# Patient Record
Sex: FEMALE | Race: AMERICAN INDIAN OR ALASKA NATIVE
[De-identification: names, ages, dates, MRNs, and addresses within clinical notes are randomized per-mention and may not be internally consistent; named-entity substitution may affect disease eponyms.]

---

## 2017-10-22 ENCOUNTER — HOSPITAL ENCOUNTER (EMERGENCY)
Dept: HOSPITAL 11 - JP.ED | Age: 27
LOS: 1 days | Discharge: NURSING, REHAB OR LONG-TERM CARE | End: 2017-10-23
Payer: MEDICAID

## 2017-10-22 DIAGNOSIS — Z79.4: ICD-10-CM

## 2017-10-22 DIAGNOSIS — E11.10: Primary | ICD-10-CM

## 2017-10-22 DIAGNOSIS — F17.210: ICD-10-CM

## 2017-10-22 PROCEDURE — 96374 THER/PROPH/DIAG INJ IV PUSH: CPT

## 2017-10-22 PROCEDURE — 85025 COMPLETE CBC W/AUTO DIFF WBC: CPT

## 2017-10-22 PROCEDURE — 82803 BLOOD GASES ANY COMBINATION: CPT

## 2017-10-22 PROCEDURE — 82009 KETONE BODYS QUAL: CPT

## 2017-10-22 PROCEDURE — 96361 HYDRATE IV INFUSION ADD-ON: CPT

## 2017-10-22 PROCEDURE — 81001 URINALYSIS AUTO W/SCOPE: CPT

## 2017-10-22 PROCEDURE — 36415 COLL VENOUS BLD VENIPUNCTURE: CPT

## 2017-10-22 PROCEDURE — 80305 DRUG TEST PRSMV DIR OPT OBS: CPT

## 2017-10-22 PROCEDURE — 93005 ELECTROCARDIOGRAM TRACING: CPT

## 2017-10-22 PROCEDURE — 80053 COMPREHEN METABOLIC PANEL: CPT

## 2017-10-22 PROCEDURE — 99285 EMERGENCY DEPT VISIT HI MDM: CPT

## 2017-10-22 PROCEDURE — 83605 ASSAY OF LACTIC ACID: CPT

## 2017-10-22 PROCEDURE — 86140 C-REACTIVE PROTEIN: CPT

## 2017-10-22 PROCEDURE — 83690 ASSAY OF LIPASE: CPT

## 2017-10-22 PROCEDURE — 36600 WITHDRAWAL OF ARTERIAL BLOOD: CPT

## 2017-10-22 PROCEDURE — 71020: CPT

## 2017-10-23 VITALS — SYSTOLIC BLOOD PRESSURE: 143 MMHG | DIASTOLIC BLOOD PRESSURE: 67 MMHG

## 2017-10-23 NOTE — EDM.PDOC
ED HPI GENERAL MEDICAL PROBLEM





- General


Chief Complaint: Gastrointestinal Problem


Stated Complaint: NAUSEA


Time Seen by Provider: 10/22/17 23:50


Source of Information: Reports: Patient, Family, RN Notes Reviewed


History Limitations: Reports: No Limitations





- History of Present Illness


INITIAL COMMENTS - FREE TEXT/NARRATIVE: 





27-year-old female presents emergency department today general complaint of not 

feeling well she states been ill for the last 24 hours feels short of breath 

feels nauseated no chest pain denies any fevers no problems with bowel 

movements or urination


  ** bottom


Pain Score (Numeric/FACES): 5





- Related Data


 Allergies











Allergy/AdvReac Type Severity Reaction Status Date / Time


 


ibuprofen Allergy Unknown Swelling Verified 10/22/17 23:40











Home Meds: 


 Home Meds





Insulin Aspart [Novolog Flexpen] 1 unit SQ ASDIRECTED 01/03/15 [History]


Insulin Detemir [Levemir] 20 unit SQ BID 01/03/15 [History]











Past Medical History


Gastrointestinal History: Reports: Other (See Below)


Other Gastrointestinal History: 90# weight loss in past year


Endocrine/Metabolic History: Reports: Diabetes, Type II


Other Dermatologic History: boils on buttock and abdomin





- Past Surgical History


GI Surgical History: Reports: Cholecystectomy





Social & Family History





- Tobacco Use


Smoking Status *Q: Current Every Day Smoker


Years of Tobacco use: 10


Packs/Tins Daily: 1


Second Hand Smoke Exposure: No





- Caffeine Use


Caffeine Use: Reports: None





- Alcohol Use


Days Per Week of Alcohol Use: 0





- Recreational Drug Use


Recreational Drug Use: Yes


Drug Use in Last 12 Months: Yes


Recreational Drug Type: Reports: Methamphetamine


Recreational Drug Use Frequency: Daily


Recreational Drug Last Use: within 1 week





- Living Situation & Occupation


Living situation: Reports: Single





ED ROS GENERAL





- Review of Systems


Review Of Systems: See Below


Constitutional: Denies: Fever, Chills


HEENT: Reports: No Symptoms


Respiratory: Reports: Shortness of Breath.  Denies: Cough, Sputum


Cardiovascular: Reports: No Symptoms


GI/Abdominal: Reports: Nausea


: Reports: No Symptoms


Musculoskeletal: Reports: No Symptoms


Skin: Reports: Other


Neurological: Reports: No Symptoms (Boils)





ED EXAM, GENERAL





- Physical Exam


Exam: See Below


Free Text/Narrative:: 





General: Female, not in any distress, alert and oriented x3 HEENT: head is 

atraumatic normocephalic, eyes pupils equal round reactive to light, sclera 

clear no conjunctivitis appreciated.  Ears tympanic membranes clear and gray 

landmarks and light reflex are present bilaterally canals are clear.  Nose no 

septal deviation, nares are clear, no blood present.  Mouth mucosa is moist and 

pink no erythema or exudate noted in soft palate, tongue is midline uvula is 

midline, dentition is poor obese,.


Neck: Supple no thyromegaly no tracheal deviation.


Nodes: Cervical nodes subclavicular nodes nontender no palpable lymphadenopathy 

noted.


Lungs: clear to auscultation bilaterally with symmetrical respirations, no 

adventitious noise appreciated.


CV: Regular rate and rhythm S1 and S2 appreciated no murmurs rubs or gallops 

noted.


Abdomen: Soft, nontender, no palpable masses or organomegaly appreciated, no 

distention no guarding bowel sounds are present,  .


Neuro: Cranial nerves II through XII grossly intact


Skin: Multiple superficial boils appreciated on the abdomen


Extremities: No lower extremity edema appreciated, 





Course





- Vital Signs


Last Recorded V/S: 


 Last Vital Signs











Temp  98.2 F   10/23/17 01:36


 


Pulse  57 L  10/23/17 01:36


 


Resp  16   10/23/17 01:36


 


BP  143/67 H  10/23/17 01:36


 


Pulse Ox  100   10/23/17 01:36














- Orders/Labs/Meds


Orders: 


 Active Orders 24 hr











 Category Date Time Status


 


 EKG Documentation Completion [RC] ASDIRECTED Care  10/23/17 01:45 Active


 


 Peripheral IV Care [RC] .AS DIRECTED Care  10/22/17 23:57 Active


 


 Chest 2V [CR] Urgent Exams  10/23/17 00:01 Taken


 


 Insulin Regular, Human [NovoLIN R] 100 unit Med  10/23/17 02:15 Active





 Sodium Chloride 0.9% [Normal Saline] 100 ml   





 IV TITRATE   


 


 Lactated Ringers [Ringers, Lactated] 1,000 ml Med  10/22/17 23:45 Active





 IV ASDIRECTED   


 


 Lactated Ringers [Ringers, Lactated] 1,000 ml Med  10/23/17 01:39 Active





 IV BOLUS   


 


 Potassium Chloride [KCL 20 MEQ in Water 100 ML] 20 meq Med  10/23/17 01:09 

Active





 Premix Bag 1 bag   





 IV ONETIME   


 


 Sodium Chloride 0.9% [Saline Flush] Med  10/22/17 23:57 Active





 10 ml FLUSH ASDIRECTED PRN   


 


 Peripheral IV Insertion Adult [OM.PC] Urgent Oth  10/22/17 23:57 Ordered


 


 EKG 12 Lead [EK] Stat Ther  10/23/17 01:45 Ordered








 Medication Orders





Lactated Ringer's (Ringers, Lactated)  1,000 mls @ 999 mls/hr IV ASDIRECTED JUAN


   Last Admin: 10/23/17 00:12  Dose: 999 mls/hr


Potassium Chloride 20 meq/ (Premix)  100 mls @ 50 mls/hr IV ONETIME ONE


   Stop: 10/23/17 03:08


   Last Admin: 10/23/17 01:20  Dose: 50 mls/hr


Lactated Ringer's (Ringers, Lactated)  1,000 mls @ 999 mls/hr IV BOLUS ONE


   Stop: 10/23/17 02:39


   Last Admin: 10/23/17 01:45  Dose: 999 mls/hr


Insulin Human Regular 100 unit (/ Sodium Chloride)  100 mls @ 0 mls/hr IV 

TITRATE JUAN; 0.1 UNITS/KG/HR


   PRN Reason: Protocol


Sodium Chloride (Saline Flush)  10 ml FLUSH ASDIRECTED PRN


   PRN Reason: Keep Vein Open


   Last Admin: 10/23/17 00:14  Dose: 10 ml








Labs: 


 Laboratory Tests











  10/22/17 10/22/17 10/22/17 Range/Units





  00:15 00:15 00:15 


 


WBC  16.2 H    (4.5-11.0)  K/uL


 


RBC  5.34    (3.30-5.50)  M/uL


 


Hgb  15.0    (12.0-15.0)  g/dL


 


Hct  45.3    (36.0-48.0)  %


 


MCV  85    (80-98)  fL


 


MCH  28    (27-31)  pg


 


MCHC  33    (32-36)  %


 


Plt Count  269    (150-400)  K/uL


 


Neut % (Auto)  83 H    (36-66)  %


 


Lymph % (Auto)  8 L    (24-44)  %


 


Mono % (Auto)  9 H    (2-6)  %


 


Eos % (Auto)  0 L    (2-4)  %


 


Baso % (Auto)  0    (0-1)  %


 


Puncture Site     


 


ABG pH     (7.350-7.450)  


 


ABG pCO2     (35.0-42.0)  mmHg


 


ABG pO2     (75.0-100.0)  mmHg


 


ABG HCO3     (22.0-26.0)  mmol/L


 


ABG Total CO2     (21.0-25.0)  mmol/L


 


ABG O2 Saturation     (95.0-98.0)  %


 


ABG O2 Content     (15.0-23.0)  %vol


 


ABG Base Excess     mm/L


 


ABG Hemoglobin     (12.0-16.0)  g/dL


 


ABG Oxyhemoglobin     %


 


ABG Carboxyhemoglobin     (0.0-1.6)  %


 


ABG Methemoglobin     %


 


Abner Test     


 


O2 Delivery Device     


 


Sodium   135 L   (140-148)  mmol/L


 


Potassium   2.9 L*   (3.6-5.2)  mmol/L


 


Chloride   105   (100-108)  mmol/L


 


Carbon Dioxide   8 L   (21-32)  mmol/L


 


Anion Gap   24.9 H   (5.0-14.0)  mmol/L


 


BUN   6 L D   (7-18)  mg/dL


 


Creatinine   0.8   (0.6-1.0)  mg/dL


 


Est Cr Clr Drug Dosing   109.41   mL/min


 


Estimated GFR (MDRD)   > 60   (>60)  


 


Glucose   247 H   ()  mg/dL


 


Lactic Acid    1.8  (0.4-2.0)  mmol/L


 


Calcium   8.2 L   (8.5-10.1)  mg/dL


 


Total Bilirubin   0.4   (0.2-1.0)  mg/dL


 


AST   8 L   (15-37)  U/L


 


ALT   9 L   (12-78)  U/L


 


Alkaline Phosphatase   139 H   ()  U/L


 


C-Reactive Protein   3.41 H   (0.0-0.3)  mg/dL


 


Total Protein   7.4   (6.4-8.2)  g/dL


 


Albumin   3.0 L   (3.4-5.0)  g/dL


 


Globulin   4.4 H   (2.3-3.5)  g/dL


 


Albumin/Globulin Ratio   0.7 L   (1.2-2.2)  


 


Lipase   70 L   ()  U/L


 


Urine Color     


 


Urine Appearance     


 


Urine pH     (4.5-8.0)  


 


Ur Specific Gravity     (1.008-1.030)  


 


Urine Protein     (NEGATIVE)  mg/dL


 


Urine Glucose (UA)     (NEGATIVE)  mg/dL


 


Urine Ketones     (NEGATIVE)  mg/dL


 


Urine Occult Blood     (NEGATIVE)  


 


Urine Nitrite     (NEGATIVE)  


 


Urine Bilirubin     (NEGATIVE)  


 


Urine Urobilinogen     (NORMAL)  mg/dL


 


Ur Leukocyte Esterase     (NEGATIVE)  


 


Urine RBC     (0-5)  


 


Urine WBC     (0-5)  


 


Ur Epithelial Cells     


 


Amorphous Sediment     


 


Urine Bacteria     


 


Urine Mucus     


 


Urine Opiates Screen     (NEGATIVE)  


 


Ur Oxycodone Screen     (NEGATIVE)  


 


Urine Methadone Screen     (NEGATIVE)  


 


Ur Propoxyphene Screen     (NEGATIVE)  


 


Ur Barbiturates Screen     (NEGATIVE)  


 


Ur Tricyclics Screen     (NEGATIVE)  


 


Ur Phencyclidine Scrn     (NEGATIVE)  


 


Ur Amphetamine Screen     (NEGATIVE)  


 


U Methamphetamines Scrn     (NEGATIVE)  


 


Urine MDMA Screen     (NEGATIVE)  


 


U Benzodiazepines Scrn     (NEGATIVE)  


 


U Cocaine Metab Screen     (NEGATIVE)  


 


U Marijuana (THC) Screen     (NEGATIVE)  


 


Ketones     (NEGATIVE)  














  10/23/17 10/23/17 10/23/17 Range/Units





  00:50 00:50 01:09 


 


WBC     (4.5-11.0)  K/uL


 


RBC     (3.30-5.50)  M/uL


 


Hgb     (12.0-15.0)  g/dL


 


Hct     (36.0-48.0)  %


 


MCV     (80-98)  fL


 


MCH     (27-31)  pg


 


MCHC     (32-36)  %


 


Plt Count     (150-400)  K/uL


 


Neut % (Auto)     (36-66)  %


 


Lymph % (Auto)     (24-44)  %


 


Mono % (Auto)     (2-6)  %


 


Eos % (Auto)     (2-4)  %


 


Baso % (Auto)     (0-1)  %


 


Puncture Site    L radial  


 


ABG pH    7.233 L  (7.350-7.450)  


 


ABG pCO2    12.8 L*  (35.0-42.0)  mmHg


 


ABG pO2    113.0 H  (75.0-100.0)  mmHg


 


ABG HCO3    5.2 L  (22.0-26.0)  mmol/L


 


ABG Total CO2    4.8 L  (21.0-25.0)  mmol/L


 


ABG O2 Saturation    98.2 H  (95.0-98.0)  %


 


ABG O2 Content    19.0  (15.0-23.0)  %vol


 


ABG Base Excess    -21.3  mm/L


 


ABG Hemoglobin    14.0  (12.0-16.0)  g/dL


 


ABG Oxyhemoglobin    96.2  %


 


ABG Carboxyhemoglobin    1.1  (0.0-1.6)  %


 


ABG Methemoglobin    0.9  %


 


Abner Test    Ok  


 


O2 Delivery Device    Room air  


 


Sodium     (140-148)  mmol/L


 


Potassium     (3.6-5.2)  mmol/L


 


Chloride     (100-108)  mmol/L


 


Carbon Dioxide     (21-32)  mmol/L


 


Anion Gap     (5.0-14.0)  mmol/L


 


BUN     (7-18)  mg/dL


 


Creatinine     (0.6-1.0)  mg/dL


 


Est Cr Clr Drug Dosing     mL/min


 


Estimated GFR (MDRD)     (>60)  


 


Glucose     ()  mg/dL


 


Lactic Acid     (0.4-2.0)  mmol/L


 


Calcium     (8.5-10.1)  mg/dL


 


Total Bilirubin     (0.2-1.0)  mg/dL


 


AST     (15-37)  U/L


 


ALT     (12-78)  U/L


 


Alkaline Phosphatase     ()  U/L


 


C-Reactive Protein     (0.0-0.3)  mg/dL


 


Total Protein     (6.4-8.2)  g/dL


 


Albumin     (3.4-5.0)  g/dL


 


Globulin     (2.3-3.5)  g/dL


 


Albumin/Globulin Ratio     (1.2-2.2)  


 


Lipase     ()  U/L


 


Urine Color   Yellow   


 


Urine Appearance   Slightly cloudy   


 


Urine pH   5.0   (4.5-8.0)  


 


Ur Specific Gravity   1.020   (1.008-1.030)  


 


Urine Protein   30 H   (NEGATIVE)  mg/dL


 


Urine Glucose (UA)   >1000 H   (NEGATIVE)  mg/dL


 


Urine Ketones   150 H   (NEGATIVE)  mg/dL


 


Urine Occult Blood   Negative   (NEGATIVE)  


 


Urine Nitrite   Negative   (NEGATIVE)  


 


Urine Bilirubin   Negative   (NEGATIVE)  


 


Urine Urobilinogen   Normal   (NORMAL)  mg/dL


 


Ur Leukocyte Esterase   Negative   (NEGATIVE)  


 


Urine RBC   0-5   (0-5)  


 


Urine WBC   5-10 H   (0-5)  


 


Ur Epithelial Cells   Moderate   


 


Amorphous Sediment   Not seen   


 


Urine Bacteria   Few   


 


Urine Mucus   Not seen   


 


Urine Opiates Screen  Negative    (NEGATIVE)  


 


Ur Oxycodone Screen  Negative    (NEGATIVE)  


 


Urine Methadone Screen  Negative    (NEGATIVE)  


 


Ur Propoxyphene Screen  Negative    (NEGATIVE)  


 


Ur Barbiturates Screen  Negative    (NEGATIVE)  


 


Ur Tricyclics Screen  Negative    (NEGATIVE)  


 


Ur Phencyclidine Scrn  Negative    (NEGATIVE)  


 


Ur Amphetamine Screen  Negative    (NEGATIVE)  


 


U Methamphetamines Scrn  Negative    (NEGATIVE)  


 


Urine MDMA Screen  Negative    (NEGATIVE)  


 


U Benzodiazepines Scrn  Negative    (NEGATIVE)  


 


U Cocaine Metab Screen  Negative    (NEGATIVE)  


 


U Marijuana (THC) Screen  Positive H    (NEGATIVE)  


 


Ketones     (NEGATIVE)  














  10/23/17 Range/Units





  01:11 


 


WBC   (4.5-11.0)  K/uL


 


RBC   (3.30-5.50)  M/uL


 


Hgb   (12.0-15.0)  g/dL


 


Hct   (36.0-48.0)  %


 


MCV   (80-98)  fL


 


MCH   (27-31)  pg


 


MCHC   (32-36)  %


 


Plt Count   (150-400)  K/uL


 


Neut % (Auto)   (36-66)  %


 


Lymph % (Auto)   (24-44)  %


 


Mono % (Auto)   (2-6)  %


 


Eos % (Auto)   (2-4)  %


 


Baso % (Auto)   (0-1)  %


 


Puncture Site   


 


ABG pH   (7.350-7.450)  


 


ABG pCO2   (35.0-42.0)  mmHg


 


ABG pO2   (75.0-100.0)  mmHg


 


ABG HCO3   (22.0-26.0)  mmol/L


 


ABG Total CO2   (21.0-25.0)  mmol/L


 


ABG O2 Saturation   (95.0-98.0)  %


 


ABG O2 Content   (15.0-23.0)  %vol


 


ABG Base Excess   mm/L


 


ABG Hemoglobin   (12.0-16.0)  g/dL


 


ABG Oxyhemoglobin   %


 


ABG Carboxyhemoglobin   (0.0-1.6)  %


 


ABG Methemoglobin   %


 


Abner Test   


 


O2 Delivery Device   


 


Sodium   (140-148)  mmol/L


 


Potassium   (3.6-5.2)  mmol/L


 


Chloride   (100-108)  mmol/L


 


Carbon Dioxide   (21-32)  mmol/L


 


Anion Gap   (5.0-14.0)  mmol/L


 


BUN   (7-18)  mg/dL


 


Creatinine   (0.6-1.0)  mg/dL


 


Est Cr Clr Drug Dosing   mL/min


 


Estimated GFR (MDRD)   (>60)  


 


Glucose   ()  mg/dL


 


Lactic Acid   (0.4-2.0)  mmol/L


 


Calcium   (8.5-10.1)  mg/dL


 


Total Bilirubin   (0.2-1.0)  mg/dL


 


AST   (15-37)  U/L


 


ALT   (12-78)  U/L


 


Alkaline Phosphatase   ()  U/L


 


C-Reactive Protein   (0.0-0.3)  mg/dL


 


Total Protein   (6.4-8.2)  g/dL


 


Albumin   (3.4-5.0)  g/dL


 


Globulin   (2.3-3.5)  g/dL


 


Albumin/Globulin Ratio   (1.2-2.2)  


 


Lipase   ()  U/L


 


Urine Color   


 


Urine Appearance   


 


Urine pH   (4.5-8.0)  


 


Ur Specific Gravity   (1.008-1.030)  


 


Urine Protein   (NEGATIVE)  mg/dL


 


Urine Glucose (UA)   (NEGATIVE)  mg/dL


 


Urine Ketones   (NEGATIVE)  mg/dL


 


Urine Occult Blood   (NEGATIVE)  


 


Urine Nitrite   (NEGATIVE)  


 


Urine Bilirubin   (NEGATIVE)  


 


Urine Urobilinogen   (NORMAL)  mg/dL


 


Ur Leukocyte Esterase   (NEGATIVE)  


 


Urine RBC   (0-5)  


 


Urine WBC   (0-5)  


 


Ur Epithelial Cells   


 


Amorphous Sediment   


 


Urine Bacteria   


 


Urine Mucus   


 


Urine Opiates Screen   (NEGATIVE)  


 


Ur Oxycodone Screen   (NEGATIVE)  


 


Urine Methadone Screen   (NEGATIVE)  


 


Ur Propoxyphene Screen   (NEGATIVE)  


 


Ur Barbiturates Screen   (NEGATIVE)  


 


Ur Tricyclics Screen   (NEGATIVE)  


 


Ur Phencyclidine Scrn   (NEGATIVE)  


 


Ur Amphetamine Screen   (NEGATIVE)  


 


U Methamphetamines Scrn   (NEGATIVE)  


 


Urine MDMA Screen   (NEGATIVE)  


 


U Benzodiazepines Scrn   (NEGATIVE)  


 


U Cocaine Metab Screen   (NEGATIVE)  


 


U Marijuana (THC) Screen   (NEGATIVE)  


 


Ketones  Small H  (NEGATIVE)  











Meds: 


Medications











Generic Name Dose Route Start Last Admin





  Trade Name Freq  PRN Reason Stop Dose Admin


 


Lactated Ringer's  1,000 mls @ 999 mls/hr  10/22/17 23:45  10/23/17 00:12





  Ringers, Lactated  IV   999 mls/hr





  ASDIRECTED JUAN   Administration


 


Potassium Chloride 20 meq/  100 mls @ 50 mls/hr  10/23/17 01:09  10/23/17 01:20





  Premix  IV  10/23/17 03:08  50 mls/hr





  ONETIME ONE   Administration


 


Lactated Ringer's  1,000 mls @ 999 mls/hr  10/23/17 01:39  10/23/17 01:45





  Ringers, Lactated  IV  10/23/17 02:39  999 mls/hr





  BOLUS ONE   Administration


 


Insulin Human Regular 100 unit  100 mls @ 0 mls/hr  10/23/17 02:15  





  / Sodium Chloride  IV   





  TITRATE JUAN   





  Protocol   





  0.1 UNITS/KG/HR   


 


Sodium Chloride  10 ml  10/22/17 23:57  10/23/17 00:14





  Saline Flush  FLUSH   10 ml





  ASDIRECTED PRN   Administration





  Keep Vein Open   














Discontinued Medications














Generic Name Dose Route Start Last Admin





  Trade Name Byron  PRN Reason Stop Dose Admin


 


Lidocaine HCl  Confirm  10/23/17 01:16  10/23/17 01:20





  Xylocaine-Mpf 1%  Administered  10/23/17 01:17  5 ml





  Dose   Administration





  5 ml   





  .ROUTE   





  .STK-MED ONE   


 


Ondansetron HCl  4 mg  10/23/17 01:22  10/23/17 01:34





  Zofran  IVPUSH  10/23/17 01:23  4 mg





  ONETIME ONE   Administration














Departure





- Departure


Time of Disposition: 02:16


Disposition: DC/[facility]fer to [facility]


Condition: Fair


Clinical Impression: 


Diabetic ketoacidosis associated with type 2 diabetes mellitus


Qualifiers:


 Diabetes mellitus complication detail: without coma Qualified Code(s): E13.10 

- Other specified diabetes mellitus with ketoacidosis without coma








- Discharge Information


Referrals: 


PCP,None [Primary Care Provider] - 


Forms:  ED Department Discharge





- My Orders


Last 24 Hours: 


My Active Orders





10/22/17 23:45


Lactated Ringers [Ringers, Lactated] 1,000 ml IV ASDIRECTED 





10/22/17 23:57


Peripheral IV Care [RC] .AS DIRECTED 


Sodium Chloride 0.9% [Saline Flush]   10 ml FLUSH ASDIRECTED PRN 


Peripheral IV Insertion Adult [OM.PC] Urgent 





10/23/17 00:01


Chest 2V [CR] Urgent 





10/23/17 01:09


Potassium Chloride [KCL 20 MEQ in Water 100 ML] 20 meq   Premix Bag 1 bag IV 

ONETIME 





10/23/17 01:39


Lactated Ringers [Ringers, Lactated] 1,000 ml IV BOLUS 





10/23/17 01:45


EKG Documentation Completion [RC] ASDIRECTED 


EKG 12 Lead [EK] Stat 





10/23/17 02:15


Insulin Regular, Human [NovoLIN R] 100 unit   Sodium Chloride 0.9% [Normal 

Saline] 100 ml IV TITRATE 














- Assessment/Plan


Last 24 Hours: 


My Active Orders





10/22/17 23:45


Lactated Ringers [Ringers, Lactated] 1,000 ml IV ASDIRECTED 





10/22/17 23:57


Peripheral IV Care [RC] .AS DIRECTED 


Sodium Chloride 0.9% [Saline Flush]   10 ml FLUSH ASDIRECTED PRN 


Peripheral IV Insertion Adult [OM.PC] Urgent 





10/23/17 00:01


Chest 2V [CR] Urgent 





10/23/17 01:09


Potassium Chloride [KCL 20 MEQ in Water 100 ML] 20 meq   Premix Bag 1 bag IV 

ONETIME 





10/23/17 01:39


Lactated Ringers [Ringers, Lactated] 1,000 ml IV BOLUS 





10/23/17 01:45


EKG Documentation Completion [RC] ASDIRECTED 


EKG 12 Lead [EK] Stat 





10/23/17 02:15


Insulin Regular, Human [NovoLIN R] 100 unit   Sodium Chloride 0.9% [Normal 

Saline] 100 ml IV TITRATE 











Plan: 





Assessment





Acuity = acute





Site and laterality = diabetic ketoacidosis mild complicated patient with 

history of diabetes mellitus and a history of chronic boils  





Etiology  = unclear etiology





Manifestations = nausea vomiting





Location of injury =  Home





Lab values = WBC elevated at 16.2 consistent leukocytosis ABG pH 7.23 PCO2 12.8 

bicarbonate 5.2 consistent with anion gap metabolic acidosis sodium low at 135 

consistent with hyponatremia potassium low at 2.9 consistent with hypokalemia 

glucose elevated 247 consistent hyperglycemia CRP elevated 3.41 albumin low at 

3.0 consistent hypoalbuminemia serum positive for ketones, urinalysis positive 

proteins of 30 consistent proteinuria glucose greater than 1000 consistent 

glucose urea ketones greater than 150 consistent ketonuria WBC 5-10 consistent 

with pyuria and urine drug screen positive for cannabis EKG demonstrates sinus 

rhythm chest x-ray I did review films myself I cannot appreciate any acute 

process, the official read from radiology is pending





Plan


Called discussed case with Dr. Salter hospitalist on call at [facility] 

kindly accepted the patient in transport she was started on 20 mEq of potassium 

recommended starting insulin drip prior to transport

















Patient was in agreement with the plan all questions were answered, they were 

instructed to return to the emergency department or call for worsening 

symptoms.  This note was dictated using dragon voice recognition software 

please call with any questions.

## 2017-10-23 NOTE — CR
Chest 2V

 

FINDINGS: The heart and vascular structures are normal in appearance. No infiltrates or effusions are
 demonstrated. The skeletal structures are unremarkable.

 

IMPRESSION: Negative exam.

## 2018-02-19 ENCOUNTER — HOSPITAL ENCOUNTER (EMERGENCY)
Dept: HOSPITAL 11 - JP.ED | Age: 28
LOS: 1 days | Discharge: NURSING, REHAB OR LONG-TERM CARE | End: 2018-02-20
Payer: MEDICAID

## 2018-02-19 DIAGNOSIS — F17.210: ICD-10-CM

## 2018-02-19 DIAGNOSIS — E11.10: Primary | ICD-10-CM

## 2018-02-19 DIAGNOSIS — Z88.8: ICD-10-CM

## 2018-02-19 DIAGNOSIS — Z79.4: ICD-10-CM

## 2018-02-19 PROCEDURE — 96374 THER/PROPH/DIAG INJ IV PUSH: CPT

## 2018-02-19 PROCEDURE — 82009 KETONE BODYS QUAL: CPT

## 2018-02-19 PROCEDURE — 85027 COMPLETE CBC AUTOMATED: CPT

## 2018-02-19 PROCEDURE — 83605 ASSAY OF LACTIC ACID: CPT

## 2018-02-19 PROCEDURE — 96375 TX/PRO/DX INJ NEW DRUG ADDON: CPT

## 2018-02-19 PROCEDURE — 96361 HYDRATE IV INFUSION ADD-ON: CPT

## 2018-02-19 PROCEDURE — 71045 X-RAY EXAM CHEST 1 VIEW: CPT

## 2018-02-19 PROCEDURE — 99285 EMERGENCY DEPT VISIT HI MDM: CPT

## 2018-02-19 PROCEDURE — 82803 BLOOD GASES ANY COMBINATION: CPT

## 2018-02-19 PROCEDURE — 80048 BASIC METABOLIC PNL TOTAL CA: CPT

## 2018-02-19 PROCEDURE — 36415 COLL VENOUS BLD VENIPUNCTURE: CPT

## 2018-02-19 NOTE — EDM.PDOC
ED HPI GENERAL MEDICAL PROBLEM





- General


Chief Complaint: Respiratory Problem


Stated Complaint: SICK ALL DAY


Time Seen by Provider: 02/19/18 22:24


Source of Information: Reports: Patient, Family (Mother), Old Records, RN Notes 

Reviewed


History Limitations: Reports: Other (Severe discomfort difficult to talk)





- History of Present Illness


INITIAL COMMENTS - FREE TEXT/NARRATIVE: 





Brought in by her mother





Chief complaint


Difficulty breathing and chest pain





History of present illness


27-year-old female started getting ill with cough and congestion 2 days ago.


Despite drinking lots of fluids, she's got weaker and weaker, very short of 

breath, chest pain and nausea, but no vomiting until she arrived here in 

emergency.


No fever noted at home.





She's been diagnosed with type 2 diabetes but has been in ketoacidosis at least 

twice in October 2017 and in November 2016, both times transferred out of here 

to a tertiary center for treatment. At one time her pH was 7.01





She's got very weak, very difficult to walk. No fever noted at home.





Reports some abdominal pain, no diarrhea


Urine appears thicker and darker to her.





She reports that she's had 3 L of water today


She reports she's also taken her insulin





She has a known history of drug abuse, including intravenous opioids and 

methamphetamine


Does not check her blood sugars


  ** Chest


Pain Score (Numeric/FACES): 8





- Related Data


 Allergies











Allergy/AdvReac Type Severity Reaction Status Date / Time


 


ibuprofen Allergy Unknown Swelling Verified 10/22/17 23:40











Home Meds: 


 Home Meds





Insulin Aspart [Novolog Flexpen] 10 unit SQ ASDIRECTED 01/03/15 [History]


Insulin Detemir [Levemir] 20 unit SQ BID 01/03/15 [History]











Past Medical History


Gastrointestinal History: Reports: Cholelithiasis, Other (See Below)


Other Gastrointestinal History: 90# weight loss in past year


Musculoskeletal History: Reports: Other (See Below)


Other Musculoskeletal History: chronic left knee pain


Psychiatric History: Reports: Addiction


Endocrine/Metabolic History: Reports: Diabetes, Type II, IDDM


Hematologic History: Reports: Anemia


Other Dermatologic History: boils on buttock and abdomin





- Past Surgical History


GI Surgical History: Reports: Cholecystectomy





Social & Family History





- Tobacco Use


Smoking Status *Q: Current Every Day Smoker


Years of Tobacco use: 11


Packs/Tins Daily: 1


Second Hand Smoke Exposure: No





- Caffeine Use


Caffeine Use: Reports: None





- Alcohol Use


Days Per Week of Alcohol Use: 0





- Recreational Drug Use


Recreational Drug Use: Yes


Drug Use in Last 12 Months: Yes


Recreational Drug Type: Reports: Marijuana/Hashish


Recreational Drug Use Frequency: Daily


Recreational Drug Last Use: within 1 week





- Living Situation & Occupation


Living situation: Reports: Single





ED ROS GENERAL





- Review of Systems


Review Of Systems: See Below


Constitutional: Reports: Malaise, Weakness, Decreased Appetite, Weight Loss.  

Denies: Fever, Diaphoresis


HEENT: Reports: Rhinitis.  Denies: Throat Pain


Respiratory: Reports: Shortness of Breath, Pleuritic Chest Pain, Cough


Cardiovascular: Reports: Chest Pain.  Denies: Edema


Endocrine: Reports: Fatigue, Other (Does not check her sugars)


GI/Abdominal: Reports: Abdominal Pain, Nausea, Vomiting.  Denies: Diarrhea


: Reports: Other (Urine darker and less frequent)


Musculoskeletal: Reports: Other (Generalized weakness)


Skin: Reports: No Symptoms


Neurological: Reports: Trouble Speaking, Difficulty Walking


Hematologic/Lymphatic: Reports: No Symptoms


Immunologic: Reports: No Symptoms





ED EXAM, GENERAL





- Physical Exam


Exam: See Below


Exam Limited By: Other (She is having difficulty talking due to her discomfort 

and distress)


General Appearance: Severe Distress (Tachypnea great 30 with heavy breathing, 

tachycardia, elevated pressure.), Other (Poor color, mouth is dry and speech is 

slurred)


Eye Exam: Bilateral Eye: Normal Inspection


Ears: Normal External Exam, Normal Canal, Hearing Grossly Normal, Normal TMs


Nose: Other (Mild congestion).  No: Nasal Drainage


Throat/Mouth: Other


Head: Atraumatic (Very dry in her mouth and tongue)


Neck: Non-Tender.  No: Lymphadenopathy (R), Lymphadenopathy (L)


Respiratory/Chest: Lungs Clear, Other (Tachypnea, deep respirations, increased 

effort)


Cardiovascular: Regular Rate, Rhythm, Tachycardia


GI/Abdominal: Normal Bowel Sounds, Non-Tender, Other (Very soft skin of the 

abdomen decreased turgor)


Extremities: Non-Tender.  No: Pedal Edema


Neurological: Slow to Respond, Other (Lethargic, generalized weakness, slurred 

speech)


Psychiatric: Flat Affect


Skin Exam: Warm, Dry, Other


Lymphatic: No Adenopathy (Patellar)





Course





- Vital Signs


Last Recorded V/S: 


 Last Vital Signs











Temp  36.0 C   02/19/18 21:56


 


Pulse  125 H  02/19/18 22:58


 


Resp  30 H  02/19/18 21:18


 


BP  150/86 H  02/19/18 22:58


 


Pulse Ox  91 L  02/19/18 22:58














- Orders/Labs/Meds


Orders: 


 Active Orders 24 hr











 Category Date Time Status


 


 POC Glucose [Blood Glucose Check, Bedside] [RC] ONETIME Care  02/19/18 22:40 

Active


 


 Peripheral IV Care [RC] .AS DIRECTED Care  02/19/18 22:33 Active


 


 Chest 1V Frontal [CR] Stat Exams  02/19/18 22:34 Taken


 


 Sodium Chloride 0.9% [Normal Saline] 1,000 ml Med  02/19/18 22:45 Active





 IV ASDIRECTED   


 


 Sodium Chloride 0.9% [Saline Flush] Med  02/19/18 22:33 Active





 10 ml FLUSH ASDIRECTED PRN   


 


 Peripheral IV Insertion Adult [OM.PC] Routine Oth  02/19/18 22:32 Ordered








 Medication Orders





Sodium Chloride (Normal Saline)  1,000 mls @ 1,000 mls/hr IV ASDIRECTED JUAN


   Last Admin: 02/19/18 23:18  Dose: 1,000 mls/hr


Sodium Chloride (Saline Flush)  10 ml FLUSH ASDIRECTED PRN


   PRN Reason: Keep Vein Open








Labs: 


 Laboratory Tests











  02/19/18 02/19/18 02/19/18 Range/Units





  22:40 22:40 22:40 


 


WBC  24.1 H    (4.5-11.0)  K/uL


 


RBC  5.83 H    (3.30-5.50)  M/uL


 


Hgb  16.6 H    (12.0-15.0)  g/dL


 


Hct  52.4 H    (36.0-48.0)  %


 


MCV  90    (80-98)  fL


 


MCH  29    (27-31)  pg


 


MCHC  32    (32-36)  %


 


Plt Count  350    (150-400)  K/uL


 


ABG Hemoglobin     (12.0-16.0)  g/dL


 


ABG Oxyhemoglobin     %


 


ABG Carboxyhemoglobin     (0.0-1.6)  %


 


ABG Methemoglobin     %


 


VBG pH     (7.350-7.450)  


 


VBG pCO2     mm/Hg


 


VBG pO2     mm/Hg


 


VBG HCO3     mmol/L


 


VBG Total CO2     mmol/L


 


VBG O2 Saturation     


 


VBG O2 Content     %vol


 


VBG Base Excess     mm/L


 


O2 Delivery Device     


 


Sodium   131 L   (140-148)  mmol/L


 


Potassium   3.9   (3.6-5.2)  mmol/L


 


Chloride   99 L   (100-108)  mmol/L


 


Carbon Dioxide   4 L   (21-32)  mmol/L


 


Anion Gap   31.9 H   (5.0-14.0)  mmol/L


 


BUN   12  D   (7-18)  mg/dL


 


Creatinine   1.0   (0.6-1.0)  mg/dL


 


Est Cr Clr Drug Dosing   90.57   mL/min


 


Estimated GFR (MDRD)   > 60   (>60)  


 


Glucose   359 H   ()  mg/dL


 


Lactic Acid    2.1 H  (0.4-2.0)  mmol/L


 


Calcium   8.4 L   (8.5-10.1)  mg/dL


 


Ketones     (NEGATIVE)  














  02/19/18 02/19/18 Range/Units





  22:40 22:40 


 


WBC    (4.5-11.0)  K/uL


 


RBC    (3.30-5.50)  M/uL


 


Hgb    (12.0-15.0)  g/dL


 


Hct    (36.0-48.0)  %


 


MCV    (80-98)  fL


 


MCH    (27-31)  pg


 


MCHC    (32-36)  %


 


Plt Count    (150-400)  K/uL


 


ABG Hemoglobin  16.5 H   (12.0-16.0)  g/dL


 


ABG Oxyhemoglobin  76.2   %


 


ABG Carboxyhemoglobin  0.4   (0.0-1.6)  %


 


ABG Methemoglobin  1.1   %


 


VBG pH  7.000 L   (7.350-7.450)  


 


VBG pCO2  15.6   mm/Hg


 


VBG pO2  48.1   mm/Hg


 


VBG HCO3  3.7   mmol/L


 


VBG Total CO2  3.6   mmol/L


 


VBG O2 Saturation  77.4   


 


VBG O2 Content  17.6   %vol


 


VBG Base Excess  -28.9   mm/L


 


O2 Delivery Device  Room air   


 


Sodium    (140-148)  mmol/L


 


Potassium    (3.6-5.2)  mmol/L


 


Chloride    (100-108)  mmol/L


 


Carbon Dioxide    (21-32)  mmol/L


 


Anion Gap    (5.0-14.0)  mmol/L


 


BUN    (7-18)  mg/dL


 


Creatinine    (0.6-1.0)  mg/dL


 


Est Cr Clr Drug Dosing    mL/min


 


Estimated GFR (MDRD)    (>60)  


 


Glucose    ()  mg/dL


 


Lactic Acid    (0.4-2.0)  mmol/L


 


Calcium    (8.5-10.1)  mg/dL


 


Ketones   Large H  (NEGATIVE)  











Meds: 


Medications











Generic Name Dose Route Start Last Admin





  Trade Name Freq  PRN Reason Stop Dose Admin


 


Sodium Chloride  1,000 mls @ 1,000 mls/hr  02/19/18 22:45  02/19/18 23:18





  Normal Saline  IV   1,000 mls/hr





  ASDIRECTED JUAN   Administration


 


Sodium Chloride  10 ml  02/19/18 22:33  





  Saline Flush  FLUSH   





  ASDIRECTED PRN   





  Keep Vein Open   














Discontinued Medications














Generic Name Dose Route Start Last Admin





  Trade Name Freq  PRN Reason Stop Dose Admin


 


Morphine Sulfate  4 mg  02/19/18 22:35  02/19/18 23:21





  Morphine  IVPUSH  02/19/18 22:36  4 mg





  ONETIME ONE   Administration


 


Ondansetron HCl  4 mg  02/19/18 22:35  02/19/18 23:19





  Zofran  IVPUSH  02/19/18 22:36  4 mg





  ONETIME ONE   Administration














- Re-Assessments/Exams


Free Text/Narrative Re-Assessment/Exam: 





02/19/18 22:47


27-year-old female with history of diabetic ketoacidosis presenting with 3 days 

of illness, tachypnea tachycardia dry on exam.


Suspect diabetic ketoacidosis again.





Gases show pH 7.0 PCO2 15.6 PO2 48.1.


Free Text/Narrative Re-Assessment/Exam: 





02/20/18 00:11


Venous pH 7.0, PCO2 15.6 and PO2 48.1


However saturation is normal by peripheral oximeter


Serum glucose 359, CO2 3.9, sodium 131, potassium BUN/creatinine and creatinine 

are normal


WBC is 24.1 hemoglobin 16.6


Serum ketones positive for large amount


Chest x-ray negative by my interpretation





This is consistent with ketoacidosis





No ICU bed available here


Transferred to [facility] where she has been hospitalized recently


02/20/18 00:15








Departure





- Departure


Time of Disposition: 00:00


Disposition: DC/Tfer to Acute Hospital [facility]


Condition: Critical


Clinical Impression: 


Diabetic ketoacidosis


Qualifiers:


 Diabetes mellitus type: type 2 Diabetes mellitus complication detail: without 

coma Qualified Code(s): E11.10 - Type 2 diabetes mellitus with ketoacidosis 

without coma








- Discharge Information


Referrals: 


PCP,None [Primary Care Provider] - 





- My Orders


Last 24 Hours: 


My Active Orders





02/19/18 22:32


Peripheral IV Insertion Adult [OM.PC] Routine 





02/19/18 22:33


Peripheral IV Care [RC] .AS DIRECTED 


Sodium Chloride 0.9% [Saline Flush]   10 ml FLUSH ASDIRECTED PRN 





02/19/18 22:34


Chest 1V Frontal [CR] Stat 





02/19/18 22:40


POC Glucose [Blood Glucose Check, Bedside] [RC] ONETIME 





02/19/18 22:45


Sodium Chloride 0.9% [Normal Saline] 1,000 ml IV ASDIRECTED 














- Assessment/Plan


Last 24 Hours: 


My Active Orders





02/19/18 22:32


Peripheral IV Insertion Adult [OM.PC] Routine 





02/19/18 22:33


Peripheral IV Care [RC] .AS DIRECTED 


Sodium Chloride 0.9% [Saline Flush]   10 ml FLUSH ASDIRECTED PRN 





02/19/18 22:34


Chest 1V Frontal [CR] Stat 





02/19/18 22:40


POC Glucose [Blood Glucose Check, Bedside] [RC] ONETIME 





02/19/18 22:45


Sodium Chloride 0.9% [Normal Saline] 1,000 ml IV ASDIRECTED

## 2018-02-20 VITALS — DIASTOLIC BLOOD PRESSURE: 94 MMHG | SYSTOLIC BLOOD PRESSURE: 147 MMHG

## 2018-02-20 NOTE — CR
Chest 1V Frontal

 

FINDINGS: The heart and vascular structures are normal in appearance. No infiltrates or effusions are
 demonstrated. The skeletal structures are unremarkable.

 

IMPRESSION: Negative exam.

## 2019-03-18 ENCOUNTER — HOSPITAL ENCOUNTER (INPATIENT)
Dept: HOSPITAL 11 - JP.ED | Age: 29
LOS: 2 days | Discharge: HOME | DRG: 639 | End: 2019-03-20
Attending: INTERNAL MEDICINE | Admitting: INTERNAL MEDICINE
Payer: MEDICAID

## 2019-03-18 DIAGNOSIS — F17.210: ICD-10-CM

## 2019-03-18 DIAGNOSIS — Z91.128: ICD-10-CM

## 2019-03-18 DIAGNOSIS — Z79.4: ICD-10-CM

## 2019-03-18 DIAGNOSIS — T38.3X6D: ICD-10-CM

## 2019-03-18 DIAGNOSIS — F15.10: ICD-10-CM

## 2019-03-18 DIAGNOSIS — Z88.8: ICD-10-CM

## 2019-03-18 DIAGNOSIS — E10.10: Primary | ICD-10-CM

## 2019-03-18 DIAGNOSIS — F12.10: ICD-10-CM

## 2019-03-18 RX ADMIN — MAGNESIUM SULFATE IN WATER PRN MLS/HR: 40 INJECTION, SOLUTION INTRAVENOUS at 17:32

## 2019-03-18 NOTE — EDM.PDOC
<Vinny Gregory - Last Filed: 03/18/19 16:07>





ED HPI GENERAL MEDICAL PROBLEM





- General


Chief Complaint: Diabetic Complaint


Stated Complaint: DIABETES


Time Seen by Provider: 03/18/19 13:30





- Related Data


 Allergies











Allergy/AdvReac Type Severity Reaction Status Date / Time


 


ibuprofen Allergy Unknown Swelling Verified 10/22/17 23:40











Home Meds: 


 Home Meds





Insulin Aspart [Novolog Flexpen] 10 unit SQ ASDIRECTED 01/03/15 [History]


Insulin Detemir [Levemir] 40 unit SQ BID 01/03/15 [History]











Course





- Vital Signs


Last Recorded V/S: 


 Last Vital Signs











Temp  37.1 C   03/18/19 22:00


 


Pulse  112 H  03/19/19 00:00


 


Resp  16   03/19/19 00:00


 


BP  112/69   03/19/19 00:00


 


Pulse Ox  99   03/19/19 00:00














- Orders/Labs/Meds


Orders: 


 Medication Orders





Acetaminophen (Tylenol)  650 mg PO Q4H PRN


   PRN Reason: Pain (Mild 1-3)/fever


Dextrose/Water (Dextrose 50% In Water)  50 ml IVPUSH ONETIME PRN


   PRN Reason: Blood Glucose


Dextrose/Sodium Chloride (Dextrose 5%-1/2 Ns)  1,000 mls @ 150 mls/hr IV 

.CONTINUOUS PRN


   PRN Reason: Blood Glucose


   Last Admin: 03/19/19 00:29  Dose: 150 mls/hr


   Infusion: 03/19/19 00:29  Dose: 150 mls/hr


   Admin: 03/18/19 18:09  Dose: 150 mls/hr


Insulin Human Regular 100 unit (/ Sodium Chloride)  100 mls @ 7.89 mls/hr IV 

TITRATE JUAN; Protocol


   Last Titration: 03/18/19 23:15  Dose: 0.07 units/kg/hr, 6 mls/hr


   Titration: 03/18/19 21:14  Dose: 0.1 units/kg/hr, 8 mls/hr


   Titration: 03/18/19 18:05  Dose: 0.08 units/kg/hr, 7 mls/hr


   Titration: 03/18/19 17:24  Dose: 0.14 units/kg/hr, 11.7 mls/hr


   Admin: 03/18/19 16:00  Dose: 0.1 units/kg/hr, 7.89 mls/hr


Magnesium Sulfate (Magnesium Sulfate 2 Gm In Water 50 Ml)  50 mls @ 25 mls/hr 

IV ONETIME PRN


   PRN Reason: low magnesium


   Last Admin: 03/18/19 17:32  Dose: 25 mls/hr


Sodium Chloride (Normal Saline)  2,000 mls @ 500 mls/hr IV .CONTINUOUS PRN


   PRN Reason: Blood Glucose


   Last Admin: 03/18/19 17:24  Dose: 500 mls/hr


Lorazepam (Ativan)  0.5 mg IVPUSH Q2H PRN


   PRN Reason: Anxiety


Ondansetron HCl (Zofran)  4 mg IV Q4H PRN


   PRN Reason: Nausea/Vomiting


Pantoprazole Sodium (Protonix***)  40 mg PO ACBREAKFAST JUAN


   Last Admin: 03/18/19 17:37  Dose: 40 mg


Potassium Chloride (Potassium Chloride Solution)  20 meq PO NOW PRN


   PRN Reason: Hypokalemia


Potassium Chloride (Potassium Chloride Solution)  40 meq PO NOW PRN


   PRN Reason: Hypokalemia


Potassium Chloride (Potassium Chloride Solution)  40 meq PO Q2H PRN


   PRN Reason: Hypokalemia


Senna/Docusate Sodium (Senna Plus)  1 tab PO BID PRN


   PRN Reason: Constipation


Sodium Chloride (Saline Flush)  10 ml FLUSH ASDIRECTED PRN


   PRN Reason: Keep Vein Open








Labs: 


 Laboratory Tests











  03/18/19 03/18/19 03/18/19 Range/Units





  13:47 13:47 13:47 


 


WBC  10.1    (4.5-11.0)  K/uL


 


RBC  6.16 H    (3.30-5.50)  M/uL


 


Hgb  16.3 H    (12.0-15.0)  g/dL


 


Hct  51.3 H    (36.0-48.0)  %


 


MCV  83    (80-98)  fL


 


MCH  27    (27-31)  pg


 


MCHC  32    (32-36)  %


 


Plt Count  278    (150-400)  K/uL


 


Neut % (Auto)  78 H    (36-66)  %


 


Lymph % (Auto)  17 L    (24-44)  %


 


Mono % (Auto)  6    (2-6)  %


 


Eos % (Auto)  0 L    (2-4)  %


 


Baso % (Auto)  0    (0-1)  %


 


Puncture Site     


 


ABG pH     (7.350-7.450)  


 


ABG pCO2     (35.0-42.0)  mmHg


 


ABG pO2     (75.0-100.0)  mmHg


 


ABG HCO3     (22.0-26.0)  mmol/L


 


ABG Total CO2     (21.0-25.0)  mmol/L


 


ABG O2 Saturation     (95.0-98.0)  %


 


ABG O2 Content     (15.0-23.0)  %vol


 


ABG Base Excess     mm/L


 


ABG Hemoglobin     (12.0-16.0)  g/dL


 


ABG Oxyhemoglobin     %


 


ABG Carboxyhemoglobin     (0.0-1.6)  %


 


ABG Methemoglobin     %


 


Abner Test     


 


O2 Delivery Device     


 


Sodium   136 L   (140-148)  mmol/L


 


Potassium   4.6   (3.6-5.2)  mmol/L


 


Chloride   101   (100-108)  mmol/L


 


Carbon Dioxide   8 L   (21-32)  mmol/L


 


Anion Gap   31.6 H   (5.0-14.0)  mmol/L


 


BUN   17   (7-18)  mg/dL


 


Creatinine   1.0   (0.6-1.0)  mg/dL


 


Est Cr Clr Drug Dosing   86.75   mL/min


 


Estimated GFR (MDRD)   > 60   (>60)  


 


Glucose   326 H   ()  mg/dL


 


Calcium   9.4   (8.5-10.1)  mg/dL


 


Total Bilirubin   0.5   (0.2-1.0)  mg/dL


 


AST   16  D   (15-37)  U/L


 


ALT   12   (12-78)  U/L


 


Alkaline Phosphatase   128 H   ()  U/L


 


Total Protein   9.1 H   (6.4-8.2)  g/dL


 


Albumin   4.2   (3.4-5.0)  g/dL


 


Globulin   4.9 H   (2.3-3.5)  g/dL


 


Albumin/Globulin Ratio   0.9 L   (1.2-2.2)  


 


Ketones    Large H  (NEGATIVE)  














  03/18/19 Range/Units





  14:29 


 


WBC   (4.5-11.0)  K/uL


 


RBC   (3.30-5.50)  M/uL


 


Hgb   (12.0-15.0)  g/dL


 


Hct   (36.0-48.0)  %


 


MCV   (80-98)  fL


 


MCH   (27-31)  pg


 


MCHC   (32-36)  %


 


Plt Count   (150-400)  K/uL


 


Neut % (Auto)   (36-66)  %


 


Lymph % (Auto)   (24-44)  %


 


Mono % (Auto)   (2-6)  %


 


Eos % (Auto)   (2-4)  %


 


Baso % (Auto)   (0-1)  %


 


Puncture Site  Rt brachial  


 


ABG pH  7.156 L*  (7.350-7.450)  


 


ABG pCO2  10.0 L*  (35.0-42.0)  mmHg


 


ABG pO2  123.0 H  (75.0-100.0)  mmHg


 


ABG HCO3  3.4 L  (22.0-26.0)  mmol/L


 


ABG Total CO2  3.1 L  (21.0-25.0)  mmol/L


 


ABG O2 Saturation  95.5  (95.0-98.0)  %


 


ABG O2 Content  21.3  (15.0-23.0)  %vol


 


ABG Base Excess  -25.5  mm/L


 


ABG Hemoglobin  15.9  (12.0-16.0)  g/dL


 


ABG Oxyhemoglobin  94.6  %


 


ABG Carboxyhemoglobin  -0.1 L  (0.0-1.6)  %


 


ABG Methemoglobin  1.0  %


 


Abner Test  Passed  


 


O2 Delivery Device  Room air  


 


Sodium   (140-148)  mmol/L


 


Potassium   (3.6-5.2)  mmol/L


 


Chloride   (100-108)  mmol/L


 


Carbon Dioxide   (21-32)  mmol/L


 


Anion Gap   (5.0-14.0)  mmol/L


 


BUN   (7-18)  mg/dL


 


Creatinine   (0.6-1.0)  mg/dL


 


Est Cr Clr Drug Dosing   mL/min


 


Estimated GFR (MDRD)   (>60)  


 


Glucose   ()  mg/dL


 


Calcium   (8.5-10.1)  mg/dL


 


Total Bilirubin   (0.2-1.0)  mg/dL


 


AST   (15-37)  U/L


 


ALT   (12-78)  U/L


 


Alkaline Phosphatase   ()  U/L


 


Total Protein   (6.4-8.2)  g/dL


 


Albumin   (3.4-5.0)  g/dL


 


Globulin   (2.3-3.5)  g/dL


 


Albumin/Globulin Ratio   (1.2-2.2)  


 


Ketones   (NEGATIVE)  











Meds: 


Medications











Generic Name Dose Route Start Last Admin





  Trade Name Freq  PRN Reason Stop Dose Admin


 


Acetaminophen  650 mg  03/18/19 16:22  





  Tylenol  PO   





  Q4H PRN   





  Pain (Mild 1-3)/fever   





     





     





     


 


Dextrose/Water  50 ml  03/18/19 16:22  





  Dextrose 50% In Water  IVPUSH   





  ONETIME PRN   





  Blood Glucose   





     





     





     


 


Dextrose/Sodium Chloride  1,000 mls @ 150 mls/hr  03/18/19 16:22  03/19/19 00:29





  Dextrose 5%-1/2 Ns  IV   150 mls/hr





  .CONTINUOUS PRN   Administration





  Blood Glucose   





     





     





     


 


Insulin Human Regular 100 unit  100 mls @ 7.89 mls/hr  03/18/19 16:50  03/18/19 

23:15





  / Sodium Chloride  IV   0.07 units/kg/hr





  TITRATE JUAN   6 mls/hr





     Titration





     





  Protocol   





  0.1 UNITS/KG/HR   


 


Magnesium Sulfate  50 mls @ 25 mls/hr  03/18/19 16:22  03/18/19 17:32





  Magnesium Sulfate 2 Gm In Water 50 Ml  IV   25 mls/hr





  ONETIME PRN   Administration





  low magnesium   





     





     





     


 


Sodium Chloride  2,000 mls @ 500 mls/hr  03/18/19 16:22  03/18/19 17:24





  Normal Saline  IV   500 mls/hr





  .CONTINUOUS PRN   Administration





  Blood Glucose   





     





     





     


 


Lorazepam  0.5 mg  03/18/19 16:22  





  Ativan  IVPUSH   





  Q2H PRN   





  Anxiety   





     





     





     


 


Ondansetron HCl  4 mg  03/18/19 16:22  





  Zofran  IV   





  Q4H PRN   





  Nausea/Vomiting   





     





     





     


 


Pantoprazole Sodium  40 mg  03/18/19 18:00  03/18/19 17:37





  Protonix***  PO   40 mg





  ACBREAKFAST JUAN   Administration





     





     





     





     


 


Potassium Chloride  20 meq  03/18/19 16:22  





  Potassium Chloride Solution  PO   





  NOW PRN   





  Hypokalemia   





     





     





     


 


Potassium Chloride  40 meq  03/18/19 16:22  





  Potassium Chloride Solution  PO   





  NOW PRN   





  Hypokalemia   





     





     





     


 


Potassium Chloride  40 meq  03/18/19 16:22  





  Potassium Chloride Solution  PO   





  Q2H PRN   





  Hypokalemia   





     





     





     


 


Senna/Docusate Sodium  1 tab  03/18/19 16:22  





  Senna Plus  PO   





  BID PRN   





  Constipation   





     





     





     


 


Sodium Chloride  10 ml  03/18/19 16:22  





  Saline Flush  FLUSH   





  ASDIRECTED PRN   





  Keep Vein Open   





     





     





     














Discontinued Medications














Generic Name Dose Route Start Last Admin





  Trade Name Byron  PRN Reason Stop Dose Admin


 


Acetaminophen  650 mg  03/18/19 14:34  03/18/19 14:41





  Tylenol  PO  03/18/19 14:35  650 mg





  NOW ONE   Administration





     





     





     





     


 


Sodium Chloride  1,000 mls @ 999 mls/hr  03/18/19 13:47  03/18/19 13:58





  Normal Saline  IV  03/18/19 14:47  999 mls/hr





  .BOLUS ONE   Administration





     





     





     





     


 


Sodium Chloride  1,000 mls @ 500 mls/hr  03/18/19 15:15  





  Normal Saline  IV   





  ASDIRECTED JUAN   





     





     





     





     


 


Insulin Human Regular 100 unit  100 mls @ 7.89 mls/hr  03/18/19 15:30  





  / Sodium Chloride  IV   





  TITRATE JUAN   





     





     





  Protocol   





  0.1 UNITS/KG/HR   


 


Insulin Human Regular 100 unit  100 mls @ 7.89 mls/hr  03/18/19 15:45  03/18/19 

16:00





  / Sodium Chloride  IV   0.1 units/kg/hr





  TITRATE JUAN   7.89 mls/hr





     Administration





     





  Protocol   





  0.1 UNITS/KG/HR   


 


Insulin Human Regular  8 unit  03/18/19 14:30  03/18/19 14:38





  Humulin R  IVPUSH  03/18/19 14:31  8 unit





  ONETIME ONE   Administration





     





     





     





     


 


Metoclopramide HCl  10 mg  03/18/19 14:25  03/18/19 14:29





  Reglan  IVPUSH  03/18/19 14:26  10 mg





  ONETIME ONE   Administration





     





     





     





     


 


Ondansetron HCl  4 mg  03/18/19 13:47  03/18/19 13:59





  Zofran  IVPUSH  03/18/19 13:48  4 mg





  ONETIME ONE   Administration





     





     





     





     


 


Sodium Chloride  10 ml  03/18/19 13:47  03/18/19 14:07





  Saline Flush  FLUSH   10 ml





  ASDIRECTED PRN   Administration





  Keep Vein Open   





     





     





     














Departure





- Departure


Disposition: Admitted As Inpatient 65


Clinical Impression: 


Diabetic ketoacidosis


Qualifiers:


 Diabetes mellitus type: other specified (including NISHI) Diabetes mellitus 

complication detail: without coma Qualified Code(s): E13.10 - Other specified 

diabetes mellitus with ketoacidosis without coma








- Discharge Information





Attestation - Student





- Attestation Statement


Attestation Statement: 


I personally performed or re-performed the physical examination and medical 

decision making. I have verified all student documentation or findings, 

including history, physical exam and/or medical decision making.








<Kayla Smith - Last Filed: 03/19/19 00:43>





ED HPI GENERAL MEDICAL PROBLEM





- General


Source of Information: Reports: Patient


History Limitations: Reports: No Limitations





- History of Present Illness


INITIAL COMMENTS - FREE TEXT/NARRATIVE: 





Pt comes in with nausea and vomiting.  She states she is out of her long acting 

insulin and has been using her short acting insulin.  She denies checking her 

blood sugars because she does not have any strips for her machine.  Pt states 

she has a slight head ache, has been vomiting  for the last 2 days and her 

nausea is intolerable.  Patient states she was hospitalized recently for her 

diabetes and not taking her insulin.  Patient has given permission to access 

her previous records.  


Onset: Sudden


Onset Date: 03/16/19


Onset Time: 08:00


Duration: Day(s):


Location: Reports: Abdomen


Quality: Reports: Other (nausea)


Severity: Moderate


Improves with: Reports: None


Worsens with: Reports: None


Context: Reports: Other (medication non-compliance)


Associated Symptoms: Reports: Headaches, Loss of Appetite, Nausea/Vomiting





Past Medical History


Gastrointestinal History: Reports: Cholelithiasis, Other (See Below)


Other Gastrointestinal History: 90# weight loss in past year


Musculoskeletal History: Reports: Other (See Below)


Other Musculoskeletal History: chronic left knee pain


Psychiatric History: Reports: Addiction


Endocrine/Metabolic History: Reports: Diabetes, Type I, IDDM


Hematologic History: Reports: Anemia


Other Dermatologic History: boils on buttock and abdomin





- Past Surgical History


GI Surgical History: Reports: Cholecystectomy





Social & Family History





- Tobacco Use


Smoking Status *Q: Current Every Day Smoker


Years of Tobacco use: 10


Packs/Tins Daily: 0.2





- Caffeine Use


Caffeine Use: Reports: Coffee





- Recreational Drug Use


Recreational Drug Use: Yes


Recreational Drug Type: Reports: Marijuana/Hashish, Methamphetamine


Recreational Drug Use Frequency: Weekly





- Living Situation & Occupation


Living situation: Reports: Single





ED ROS GENERAL





- Review of Systems


Review Of Systems: See Below


Constitutional: Reports: Decreased Appetite


HEENT: Reports: Rhinitis


Respiratory: Reports: Cough


Cardiovascular: Reports: No Symptoms


Endocrine: Reports: High Glucose


GI/Abdominal: Reports: Abdominal Pain, Decreased Appetite, Nausea


: Reports: No Symptoms


Musculoskeletal: Reports: No Symptoms


Skin: Reports: No Symptoms


Neurological: Reports: Headache


Psychiatric: Reports: No Symptoms


Hematologic/Lymphatic: Reports: No Symptoms


Immunologic: Reports: No Symptoms





ED EXAM GENERAL NO PERIP PULSE





- Physical Exam


Exam: See Below


Text/Narrative:: 





27 y/o female appears stated age in moderate distress  


Exam Limited By: No Limitations


General Appearance: Alert, Moderate Distress


Respiratory/Chest: No Respiratory Distress, Lungs Clear, Normal Breath Sounds, 

No Accessory Muscle Use


Cardiovascular: Normal Peripheral Pulses, Regular Rate, Rhythm, No Edema, No 

Gallop, No Murmur, No Rub, Tachycardia


GI/Abdominal: Normal Bowel Sounds, Soft, No Organomegaly, No Distention, No 

Abnormal Bruit, No Mass


Extremities: Normal Inspection, Normal Range of Motion, Non-Tender, No Pedal 

Edema, Normal Capillary Refill


Neurological: Alert, Oriented, CN II-XII Intact, Normal Cognition, Normal Gait, 

Normal Reflexes, No Motor/Sensory Deficits


Psychiatric: Normal Affect, Normal Mood


Skin Exam: Warm, Dry, Intact, Normal Color, No Rash, Tattoo(s)


Lymphatic: No Adenopathy





Course





- Orders/Labs/Meds


Labs: 


 Laboratory Tests











  03/18/19 03/18/19 03/18/19 Range/Units





  13:47 13:47 13:47 


 


WBC  10.1    (4.5-11.0)  K/uL


 


RBC  6.16 H    (3.30-5.50)  M/uL


 


Hgb  16.3 H    (12.0-15.0)  g/dL


 


Hct  51.3 H    (36.0-48.0)  %


 


MCV  83    (80-98)  fL


 


MCH  27    (27-31)  pg


 


MCHC  32    (32-36)  %


 


Plt Count  278    (150-400)  K/uL


 


Neut % (Auto)  78 H    (36-66)  %


 


Lymph % (Auto)  17 L    (24-44)  %


 


Mono % (Auto)  6    (2-6)  %


 


Eos % (Auto)  0 L    (2-4)  %


 


Baso % (Auto)  0    (0-1)  %


 


Puncture Site     


 


ABG pH     (7.350-7.450)  


 


ABG pCO2     (35.0-42.0)  mmHg


 


ABG pO2     (75.0-100.0)  mmHg


 


ABG HCO3     (22.0-26.0)  mmol/L


 


ABG Total CO2     (21.0-25.0)  mmol/L


 


ABG O2 Saturation     (95.0-98.0)  %


 


ABG O2 Content     (15.0-23.0)  %vol


 


ABG Base Excess     mm/L


 


ABG Hemoglobin     (12.0-16.0)  g/dL


 


ABG Oxyhemoglobin     %


 


ABG Carboxyhemoglobin     (0.0-1.6)  %


 


ABG Methemoglobin     %


 


Abner Test     


 


O2 Delivery Device     


 


Sodium   136 L   (140-148)  mmol/L


 


Potassium   4.6   (3.6-5.2)  mmol/L


 


Chloride   101   (100-108)  mmol/L


 


Carbon Dioxide   8 L   (21-32)  mmol/L


 


Anion Gap   31.6 H   (5.0-14.0)  mmol/L


 


BUN   17   (7-18)  mg/dL


 


Creatinine   1.0   (0.6-1.0)  mg/dL


 


Est Cr Clr Drug Dosing   86.75   mL/min


 


Estimated GFR (MDRD)   > 60   (>60)  


 


Glucose   326 H   ()  mg/dL


 


Calcium   9.4   (8.5-10.1)  mg/dL


 


Total Bilirubin   0.5   (0.2-1.0)  mg/dL


 


AST   16  D   (15-37)  U/L


 


ALT   12   (12-78)  U/L


 


Alkaline Phosphatase   128 H   ()  U/L


 


Total Protein   9.1 H   (6.4-8.2)  g/dL


 


Albumin   4.2   (3.4-5.0)  g/dL


 


Globulin   4.9 H   (2.3-3.5)  g/dL


 


Albumin/Globulin Ratio   0.9 L   (1.2-2.2)  


 


Ketones    Large H  (NEGATIVE)  














  03/18/19 Range/Units





  14:29 


 


WBC   (4.5-11.0)  K/uL


 


RBC   (3.30-5.50)  M/uL


 


Hgb   (12.0-15.0)  g/dL


 


Hct   (36.0-48.0)  %


 


MCV   (80-98)  fL


 


MCH   (27-31)  pg


 


MCHC   (32-36)  %


 


Plt Count   (150-400)  K/uL


 


Neut % (Auto)   (36-66)  %


 


Lymph % (Auto)   (24-44)  %


 


Mono % (Auto)   (2-6)  %


 


Eos % (Auto)   (2-4)  %


 


Baso % (Auto)   (0-1)  %


 


Puncture Site  Rt brachial  


 


ABG pH  7.156 L*  (7.350-7.450)  


 


ABG pCO2  10.0 L*  (35.0-42.0)  mmHg


 


ABG pO2  123.0 H  (75.0-100.0)  mmHg


 


ABG HCO3  3.4 L  (22.0-26.0)  mmol/L


 


ABG Total CO2  3.1 L  (21.0-25.0)  mmol/L


 


ABG O2 Saturation  95.5  (95.0-98.0)  %


 


ABG O2 Content  21.3  (15.0-23.0)  %vol


 


ABG Base Excess  -25.5  mm/L


 


ABG Hemoglobin  15.9  (12.0-16.0)  g/dL


 


ABG Oxyhemoglobin  94.6  %


 


ABG Carboxyhemoglobin  -0.1 L  (0.0-1.6)  %


 


ABG Methemoglobin  1.0  %


 


Abner Test  Passed  


 


O2 Delivery Device  Room air  


 


Sodium   (140-148)  mmol/L


 


Potassium   (3.6-5.2)  mmol/L


 


Chloride   (100-108)  mmol/L


 


Carbon Dioxide   (21-32)  mmol/L


 


Anion Gap   (5.0-14.0)  mmol/L


 


BUN   (7-18)  mg/dL


 


Creatinine   (0.6-1.0)  mg/dL


 


Est Cr Clr Drug Dosing   mL/min


 


Estimated GFR (MDRD)   (>60)  


 


Glucose   ()  mg/dL


 


Calcium   (8.5-10.1)  mg/dL


 


Total Bilirubin   (0.2-1.0)  mg/dL


 


AST   (15-37)  U/L


 


ALT   (12-78)  U/L


 


Alkaline Phosphatase   ()  U/L


 


Total Protein   (6.4-8.2)  g/dL


 


Albumin   (3.4-5.0)  g/dL


 


Globulin   (2.3-3.5)  g/dL


 


Albumin/Globulin Ratio   (1.2-2.2)  


 


Ketones   (NEGATIVE)  














- Re-Assessments/Exams


Free Text/Narrative Re-Assessment/Exam: 





03/18/19 15:34


Additional lab collected critical PH at 7.156 and pCO2 at 10.  Insulin drip to 

be initiated.  Call to Hospitalist for inpt admission.  Discussed with patient 

and she is agreeable to this plan








Departure





- Departure


Time of Disposition: 16:07


Condition: Serious





- Discharge Information


*PRESCRIPTION DRUG MONITORING PROGRAM REVIEWED*: Not Applicable


*COPY OF PRESCRIPTION DRUG MONITORING REPORT IN PATIENT SHELIA: Not Applicable

## 2019-03-18 NOTE — PCM.HP
H&P History of Present Illness





- General


Date of Service: 03/18/19


Admit Problem/Dx: 


 Admission Diagnosis/Problem





Admission Diagnosis/Problem      Ketoacidosis








Source of Information: Patient, Old Records, Provider, RN Notes Reviewed


History Limitations: Reports: No Limitations





- History of Present Illness


Initial Comments - Free Text/Narative: 





Ms. Guo is a 28-year-old woman who is admitted through the emergency 

department with weakness, nausea, and vomiting secondary to diabetic 

ketoacidosis. She has a known and long-standing history of type 1 diabetes 

mellitus, with ongoing poor control. She has had recurrent admissions for 

ketoacidosis, because she runs out of the insulin. She also has a known history 

of drug abuse and admits to recently taking methamphetamine and marijuana on a 

daily basis. She ran out of all of her insulins approximately 3 days ago and 

has become progressively more weak since then. On evaluation in the emergency 

department glucose level is elevated at 360 and she has obvious evidence of 

ketoacidosis with a pH of 7.15, carbon dioxide level of 8 and an anion gap of 

31.





- Related Data


Allergies/Adverse Reactions: 


 Allergies











Allergy/AdvReac Type Severity Reaction Status Date / Time


 


ibuprofen Allergy Unknown Swelling Verified 10/22/17 23:40











Home Medications: 


 Home Meds





Insulin Aspart [Novolog Flexpen] 10 unit SQ ASDIRECTED 01/03/15 [History]


Insulin Detemir [Levemir] 40 unit SQ BID 01/03/15 [History]











Past Medical History


Gastrointestinal History: Reports: Cholelithiasis, Other (See Below)


Other Gastrointestinal History: 90# weight loss in past year


Musculoskeletal History: Reports: Other (See Below)


Other Musculoskeletal History: chronic left knee pain


Psychiatric History: Reports: Addiction


Endocrine/Metabolic History: Reports: Diabetes, Type I, IDDM


Hematologic History: Reports: Anemia


Other Dermatologic History: boils on buttock and abdomin





- Past Surgical History


GI Surgical History: Reports: Cholecystectomy





Social & Family History





- Tobacco Use


Smoking Status *Q: Current Every Day Smoker


Years of Tobacco use: 10


Packs/Tins Daily: 0.2





- Caffeine Use


Caffeine Use: Reports: Coffee





- Recreational Drug Use


Recreational Drug Use: Yes


Recreational Drug Type: Reports: Marijuana/Hashish, Methamphetamine


Recreational Drug Use Frequency: Weekly





- Living Situation & Occupation


Living situation: Reports: Single





H&P Review of Systems





- Review of Systems:


Review Of Systems: See Below


General: Reports: Weakness.  Denies: Fever, Chills


HEENT: Reports: No Symptoms


Pulmonary: Reports: No Symptoms


Cardiovascular: Reports: No Symptoms


Gastrointestinal: Reports: Nausea, Vomiting.  Denies: Abdominal Pain, 

Constipation, Diarrhea, Hematemesis, Hematochezia, Melena


Genitourinary: Reports: No Symptoms


Musculoskeletal: Reports: No Symptoms


Skin: Reports: No Symptoms


Psychiatric: Reports: No Symptoms


Neurological: Reports: No Symptoms


Hematologic/Lymphatic: Reports: No Symptoms


Immunologic: Reports: No Symptoms





Exam





- Exam


Exam: See Below





- Vital Signs


Vital Signs: 


 Last Vital Signs











Temp  97.8 F   03/18/19 13:23


 


Pulse  121 H  03/18/19 13:23


 


Resp  18   03/18/19 13:23


 


BP  133/85   03/18/19 13:23


 


Pulse Ox  100   03/18/19 13:23











Weight: 174 lb





- Exam


General: Alert, Oriented, Cooperative, Moderate Distress


HEENT: Conjunctiva Clear, Hearing Intact, Normal Nasal Septum, Posterior 

Pharynx Clear, Pupils Equal.  No: Mucosa Moist & Pink


Neck: Supple, Trachea Midline, +2 Carotid Pulse wo Bruit


Lungs: Clear to Auscultation, Normal Respiratory Effort


Cardiovascular: Regular Rate, Regular Rhythm, Normal S1, Normal S2.  No: 

Systolic Murmur, Diastolic Murmur


GI/Abdominal Exam: Soft, Non-Tender, No Organomegaly, No Distention


Back Exam: Normal Inspection, Full Range of Motion


Extremities: Non-Tender, No Pedal Edema


Skin: Warm, Dry, Intact


Neurological: Cranial Nerves Intact, Strength Equal Bilateral, Normal Speech, 

Normal Tone, Sensation Intact.  No: Focal Deficit


Neuro Extensive - Mental Status: Alert, Oriented x3, Normal Mood/Affect, Normal 

Cognition, Memory Intact





- Patient Data


Lab Results Last 24 hrs: 


 Laboratory Results - last 24 hr











  03/18/19 03/18/19 03/18/19 Range/Units





  13:47 13:47 13:47 


 


WBC  10.1    (4.5-11.0)  K/uL


 


RBC  6.16 H    (3.30-5.50)  M/uL


 


Hgb  16.3 H    (12.0-15.0)  g/dL


 


Hct  51.3 H    (36.0-48.0)  %


 


MCV  83    (80-98)  fL


 


MCH  27    (27-31)  pg


 


MCHC  32    (32-36)  %


 


Plt Count  278    (150-400)  K/uL


 


Neut % (Auto)  78 H    (36-66)  %


 


Lymph % (Auto)  17 L    (24-44)  %


 


Mono % (Auto)  6    (2-6)  %


 


Eos % (Auto)  0 L    (2-4)  %


 


Baso % (Auto)  0    (0-1)  %


 


Puncture Site     


 


ABG pH     (7.350-7.450)  


 


ABG pCO2     (35.0-42.0)  mmHg


 


ABG pO2     (75.0-100.0)  mmHg


 


ABG HCO3     (22.0-26.0)  mmol/L


 


ABG Total CO2     (21.0-25.0)  mmol/L


 


ABG O2 Saturation     (95.0-98.0)  %


 


ABG O2 Content     (15.0-23.0)  %vol


 


ABG Base Excess     mm/L


 


ABG Hemoglobin     (12.0-16.0)  g/dL


 


ABG Oxyhemoglobin     %


 


ABG Carboxyhemoglobin     (0.0-1.6)  %


 


ABG Methemoglobin     %


 


Abner Test     


 


O2 Delivery Device     


 


Sodium   136 L   (140-148)  mmol/L


 


Potassium   4.6   (3.6-5.2)  mmol/L


 


Chloride   101   (100-108)  mmol/L


 


Carbon Dioxide   8 L   (21-32)  mmol/L


 


Anion Gap   31.6 H   (5.0-14.0)  mmol/L


 


BUN   17   (7-18)  mg/dL


 


Creatinine   1.0   (0.6-1.0)  mg/dL


 


Est Cr Clr Drug Dosing   86.75   mL/min


 


Estimated GFR (MDRD)   > 60   (>60)  


 


Glucose   326 H   ()  mg/dL


 


Calcium   9.4   (8.5-10.1)  mg/dL


 


Total Bilirubin   0.5   (0.2-1.0)  mg/dL


 


AST   16  D   (15-37)  U/L


 


ALT   12   (12-78)  U/L


 


Alkaline Phosphatase   128 H   ()  U/L


 


Total Protein   9.1 H   (6.4-8.2)  g/dL


 


Albumin   4.2   (3.4-5.0)  g/dL


 


Globulin   4.9 H   (2.3-3.5)  g/dL


 


Albumin/Globulin Ratio   0.9 L   (1.2-2.2)  


 


Ketones    Large H  (NEGATIVE)  














  03/18/19 Range/Units





  14:29 


 


WBC   (4.5-11.0)  K/uL


 


RBC   (3.30-5.50)  M/uL


 


Hgb   (12.0-15.0)  g/dL


 


Hct   (36.0-48.0)  %


 


MCV   (80-98)  fL


 


MCH   (27-31)  pg


 


MCHC   (32-36)  %


 


Plt Count   (150-400)  K/uL


 


Neut % (Auto)   (36-66)  %


 


Lymph % (Auto)   (24-44)  %


 


Mono % (Auto)   (2-6)  %


 


Eos % (Auto)   (2-4)  %


 


Baso % (Auto)   (0-1)  %


 


Puncture Site  Rt brachial  


 


ABG pH  7.156 L*  (7.350-7.450)  


 


ABG pCO2  10.0 L*  (35.0-42.0)  mmHg


 


ABG pO2  123.0 H  (75.0-100.0)  mmHg


 


ABG HCO3  3.4 L  (22.0-26.0)  mmol/L


 


ABG Total CO2  3.1 L  (21.0-25.0)  mmol/L


 


ABG O2 Saturation  95.5  (95.0-98.0)  %


 


ABG O2 Content  21.3  (15.0-23.0)  %vol


 


ABG Base Excess  -25.5  mm/L


 


ABG Hemoglobin  15.9  (12.0-16.0)  g/dL


 


ABG Oxyhemoglobin  94.6  %


 


ABG Carboxyhemoglobin  -0.1 L  (0.0-1.6)  %


 


ABG Methemoglobin  1.0  %


 


Abner Test  Passed  


 


O2 Delivery Device  Room air  


 


Sodium   (140-148)  mmol/L


 


Potassium   (3.6-5.2)  mmol/L


 


Chloride   (100-108)  mmol/L


 


Carbon Dioxide   (21-32)  mmol/L


 


Anion Gap   (5.0-14.0)  mmol/L


 


BUN   (7-18)  mg/dL


 


Creatinine   (0.6-1.0)  mg/dL


 


Est Cr Clr Drug Dosing   mL/min


 


Estimated GFR (MDRD)   (>60)  


 


Glucose   ()  mg/dL


 


Calcium   (8.5-10.1)  mg/dL


 


Total Bilirubin   (0.2-1.0)  mg/dL


 


AST   (15-37)  U/L


 


ALT   (12-78)  U/L


 


Alkaline Phosphatase   ()  U/L


 


Total Protein   (6.4-8.2)  g/dL


 


Albumin   (3.4-5.0)  g/dL


 


Globulin   (2.3-3.5)  g/dL


 


Albumin/Globulin Ratio   (1.2-2.2)  


 


Ketones   (NEGATIVE)  











Result Diagrams: 


 03/18/19 13:47





 03/18/19 13:47





*Q Meaningful Use (ADM)





- VTE Risk Assess *Q


Each Risk Factor Represents 1 Point: None


Total Score 1 Point Risk Factors: 0


Each Risk Factor Represents 2 Points: None


Total Score 2 Point Risk Factors: 0


Each Risk Factor Represents 3 Points: None


Total Score 3 Point Risk Factors: 0


Each Risk Factor Represents 5 Points: None


Total Score 5 Point Risk Factors: 0


Venous Thromboembolism Risk Factor Score *Q: 0


Problem List Initiated/Reviewed/Updated: Yes


Orders Last 24hrs: 


 Active Orders 24 hr











 Category Date Time Status


 


 Patient Status Manage Transfer [TRANSFER] Routine ADT  03/18/19 15:36 Ordered


 


 Blood Glucose Check, Bedside [RC] ONETIME Care  03/18/19 15:42 Active


 


 Peripheral IV Care [RC] .AS DIRECTED Care  03/18/19 13:48 Active


 


 Insulin Regular, Human [HumuLIN R] 100 unit Med  03/18/19 15:45 Active





 Sodium Chloride 0.9% [Normal Saline] 99 ml   





 IV TITRATE   


 


 Sodium Chloride 0.9% [Normal Saline] 1,000 ml Med  03/18/19 15:15 Active





 IV ASDIRECTED   


 


 Sodium Chloride 0.9% [Saline Flush] Med  03/18/19 13:47 Active





 10 ml FLUSH ASDIRECTED PRN   


 


 Peripheral IV Insertion Adult [OM.PC] Routine Oth  03/18/19 13:47 Ordered


 


 Resuscitation Status Routine Resus Stat  03/18/19 15:37 Ordered








 Medication Orders





Sodium Chloride (Normal Saline)  1,000 mls @ 500 mls/hr IV ASDIRECTED JUAN


Insulin Human Regular 100 unit (/ Sodium Chloride)  100 mls @ 7.89 mls/hr IV 

TITRATE JUAN; Protocol


Sodium Chloride (Saline Flush)  10 ml FLUSH ASDIRECTED PRN


   PRN Reason: Keep Vein Open


   Last Admin: 03/18/19 14:07  Dose: 10 ml








Assessment/Plan Comment:: 





ASSESSMENT AND PLAN





DIABETIC KETOACIDOSIS-no evidence of underlying infection, she has been out of 

all of her insulin for 3 days.


-Vigorous IV fluid replacement per protocol


-IV insulins per protocol


-Frequent monitoring of electrolytes and replacement per protocol


-At the time of discharge arrange for primary care and frequent monitoring of 

diabetes





DRUG ABUSE-on admission she expresses interest in treatment of her drug 

addiction with methamphetamine and marijuana





MAINTENANCE ISSUES


-DVT prophylaxis; SCUDs


-GI prophylaxis; Protonix 40 mg by mouth daily


-Franco catheter; not indicated


-Nutrition; consistent carb diet


-Nicotine dependence; not required





CODE STATUS-FULL CODE





ADMISSION STATUS-patient will be admitted to inpatient status, expect at least 

a 2 night hospital stay for evaluation and management of problems as outlined 

above.  At the time of this admission I do not reasonably expected evaluation 

and management of this problem will require more than a 96 hour hospital stay.





DISPOSITION-anticipate discharge to home after the hospital stay.





PRIMARY CARE PROVIDER-she does not currently have a primary care provider

## 2019-03-19 RX ADMIN — POTASSIUM CHLORIDE PRN MEQ: 20 SOLUTION ORAL at 11:19

## 2019-03-19 RX ADMIN — POTASSIUM CHLORIDE PRN MEQ: 20 SOLUTION ORAL at 07:22

## 2019-03-19 RX ADMIN — INSULIN GLARGINE SCH UNITS: 100 INJECTION, SOLUTION SUBCUTANEOUS at 20:26

## 2019-03-19 RX ADMIN — POTASSIUM CHLORIDE PRN MEQ: 20 SOLUTION ORAL at 13:25

## 2019-03-19 RX ADMIN — POTASSIUM CHLORIDE PRN MEQ: 20 SOLUTION ORAL at 09:08

## 2019-03-19 RX ADMIN — INSULIN GLARGINE SCH UNITS: 100 INJECTION, SOLUTION SUBCUTANEOUS at 10:15

## 2019-03-19 RX ADMIN — INSULIN LISPRO SCH UNITS: 100 INJECTION, SOLUTION INTRAVENOUS; SUBCUTANEOUS at 20:25

## 2019-03-19 RX ADMIN — INSULIN LISPRO SCH: 100 INJECTION, SOLUTION INTRAVENOUS; SUBCUTANEOUS at 17:54

## 2019-03-19 RX ADMIN — MAGNESIUM SULFATE IN WATER PRN MLS/HR: 40 INJECTION, SOLUTION INTRAVENOUS at 09:09

## 2019-03-19 RX ADMIN — POTASSIUM CHLORIDE PRN MEQ: 20 SOLUTION ORAL at 05:13

## 2019-03-19 NOTE — PCM.PN
- General Info


Date of Service: 03/19/19


Subjective Update: 





Ms. Guo has improved since admission, glucose levels over the past several 

hours have been between 101-150. Continues to show evidence of mild ketoacidosis

, with elevation in anion gap and low carbon dioxide. She feels improved and 

has more energy, nausea and vomiting have resolved.





- Review of Systems


General: Denies: Fever, Chills


Pulmonary: Reports: No Symptoms


Cardiovascular: Reports: No Symptoms


Gastrointestinal: Reports: No Symptoms





- Patient Data


Vitals - Most Recent: 


 Last Vital Signs











Temp  97.0 F   03/19/19 07:00


 


Pulse  111 H  03/19/19 09:00


 


Resp  14   03/19/19 09:00


 


BP  85/44 L  03/19/19 09:00


 


Pulse Ox  100   03/19/19 09:00











Weight - Most Recent: 173 lb 15.997 oz


I&O - Last 24 Hours: 


 Intake & Output











 03/18/19 03/19/19 03/19/19





 22:59 06:59 14:59


 


Intake Total 


 


Output Total 1500  


 


Balance -











Lab Results Last 24 Hours: 


 Laboratory Results - last 24 hr











  03/18/19 03/18/19 03/18/19 Range/Units





  13:47 13:47 13:47 


 


WBC  10.1    (4.5-11.0)  K/uL


 


RBC  6.16 H    (3.30-5.50)  M/uL


 


Hgb  16.3 H    (12.0-15.0)  g/dL


 


Hct  51.3 H    (36.0-48.0)  %


 


MCV  83    (80-98)  fL


 


MCH  27    (27-31)  pg


 


MCHC  32    (32-36)  %


 


Plt Count  278    (150-400)  K/uL


 


Neut % (Auto)  78 H    (36-66)  %


 


Lymph % (Auto)  17 L    (24-44)  %


 


Mono % (Auto)  6    (2-6)  %


 


Eos % (Auto)  0 L    (2-4)  %


 


Baso % (Auto)  0    (0-1)  %


 


Puncture Site     


 


ABG pH     (7.350-7.450)  


 


ABG pCO2     (35.0-42.0)  mmHg


 


ABG pO2     (75.0-100.0)  mmHg


 


ABG HCO3     (22.0-26.0)  mmol/L


 


ABG Total CO2     (21.0-25.0)  mmol/L


 


ABG O2 Saturation     (95.0-98.0)  %


 


ABG O2 Content     (15.0-23.0)  %vol


 


ABG Base Excess     mm/L


 


ABG Hemoglobin     (12.0-16.0)  g/dL


 


ABG Oxyhemoglobin     %


 


ABG Carboxyhemoglobin     (0.0-1.6)  %


 


ABG Methemoglobin     %


 


Abner Test     


 


O2 Delivery Device     


 


Sodium   136 L   (140-148)  mmol/L


 


Potassium   4.6   (3.6-5.2)  mmol/L


 


Chloride   101   (100-108)  mmol/L


 


Carbon Dioxide   8 L   (21-32)  mmol/L


 


Anion Gap   31.6 H   (5.0-14.0)  mmol/L


 


BUN   17   (7-18)  mg/dL


 


Creatinine   1.0   (0.6-1.0)  mg/dL


 


Est Cr Clr Drug Dosing   86.75   mL/min


 


Estimated GFR (MDRD)   > 60   (>60)  


 


Glucose   326 H   ()  mg/dL


 


Calcium   9.4   (8.5-10.1)  mg/dL


 


Phosphorus     (2.5-4.9)  mg/dL


 


Magnesium     (1.8-2.4)  mg/dL


 


Total Bilirubin   0.5   (0.2-1.0)  mg/dL


 


AST   16  D   (15-37)  U/L


 


ALT   12   (12-78)  U/L


 


Alkaline Phosphatase   128 H   ()  U/L


 


Total Protein   9.1 H   (6.4-8.2)  g/dL


 


Albumin   4.2   (3.4-5.0)  g/dL


 


Globulin   4.9 H   (2.3-3.5)  g/dL


 


Albumin/Globulin Ratio   0.9 L   (1.2-2.2)  


 


Ketones    Large H  (NEGATIVE)  














  03/18/19 03/18/19 03/18/19 Range/Units





  14:29 16:22 18:51 


 


WBC     (4.5-11.0)  K/uL


 


RBC     (3.30-5.50)  M/uL


 


Hgb     (12.0-15.0)  g/dL


 


Hct     (36.0-48.0)  %


 


MCV     (80-98)  fL


 


MCH     (27-31)  pg


 


MCHC     (32-36)  %


 


Plt Count     (150-400)  K/uL


 


Neut % (Auto)     (36-66)  %


 


Lymph % (Auto)     (24-44)  %


 


Mono % (Auto)     (2-6)  %


 


Eos % (Auto)     (2-4)  %


 


Baso % (Auto)     (0-1)  %


 


Puncture Site  Rt brachial    


 


ABG pH  7.156 L*    (7.350-7.450)  


 


ABG pCO2  10.0 L*    (35.0-42.0)  mmHg


 


ABG pO2  123.0 H    (75.0-100.0)  mmHg


 


ABG HCO3  3.4 L    (22.0-26.0)  mmol/L


 


ABG Total CO2  3.1 L    (21.0-25.0)  mmol/L


 


ABG O2 Saturation  95.5    (95.0-98.0)  %


 


ABG O2 Content  21.3    (15.0-23.0)  %vol


 


ABG Base Excess  -25.5    mm/L


 


ABG Hemoglobin  15.9    (12.0-16.0)  g/dL


 


ABG Oxyhemoglobin  94.6    %


 


ABG Carboxyhemoglobin  -0.1 L    (0.0-1.6)  %


 


ABG Methemoglobin  1.0    %


 


Abner Test  Passed    


 


O2 Delivery Device  Room air    


 


Sodium   138 L   (140-148)  mmol/L


 


Potassium   4.8  4.0  (3.6-5.2)  mmol/L


 


Chloride   105   (100-108)  mmol/L


 


Carbon Dioxide   4 L   (21-32)  mmol/L


 


Anion Gap   33.8 H   (5.0-14.0)  mmol/L


 


BUN   15   (7-18)  mg/dL


 


Creatinine   0.8   (0.6-1.0)  mg/dL


 


Est Cr Clr Drug Dosing   108.44   mL/min


 


Estimated GFR (MDRD)   > 60   (>60)  


 


Glucose   314 H   ()  mg/dL


 


Calcium   8.5   (8.5-10.1)  mg/dL


 


Phosphorus   3.2   (2.5-4.9)  mg/dL


 


Magnesium   1.6 L D   (1.8-2.4)  mg/dL


 


Total Bilirubin     (0.2-1.0)  mg/dL


 


AST     (15-37)  U/L


 


ALT     (12-78)  U/L


 


Alkaline Phosphatase     ()  U/L


 


Total Protein     (6.4-8.2)  g/dL


 


Albumin     (3.4-5.0)  g/dL


 


Globulin     (2.3-3.5)  g/dL


 


Albumin/Globulin Ratio     (1.2-2.2)  


 


Ketones     (NEGATIVE)  














  03/18/19 03/18/19 03/19/19 Range/Units





  20:26 22:30 00:22 


 


WBC     (4.5-11.0)  K/uL


 


RBC     (3.30-5.50)  M/uL


 


Hgb     (12.0-15.0)  g/dL


 


Hct     (36.0-48.0)  %


 


MCV     (80-98)  fL


 


MCH     (27-31)  pg


 


MCHC     (32-36)  %


 


Plt Count     (150-400)  K/uL


 


Neut % (Auto)     (36-66)  %


 


Lymph % (Auto)     (24-44)  %


 


Mono % (Auto)     (2-6)  %


 


Eos % (Auto)     (2-4)  %


 


Baso % (Auto)     (0-1)  %


 


Puncture Site     


 


ABG pH     (7.350-7.450)  


 


ABG pCO2     (35.0-42.0)  mmHg


 


ABG pO2     (75.0-100.0)  mmHg


 


ABG HCO3     (22.0-26.0)  mmol/L


 


ABG Total CO2     (21.0-25.0)  mmol/L


 


ABG O2 Saturation     (95.0-98.0)  %


 


ABG O2 Content     (15.0-23.0)  %vol


 


ABG Base Excess     mm/L


 


ABG Hemoglobin     (12.0-16.0)  g/dL


 


ABG Oxyhemoglobin     %


 


ABG Carboxyhemoglobin     (0.0-1.6)  %


 


ABG Methemoglobin     %


 


Abner Test     


 


O2 Delivery Device     


 


Sodium  139 L   137 L  (140-148)  mmol/L


 


Potassium  4.2  4.4  3.1 L  (3.6-5.2)  mmol/L


 


Chloride  108   108  (100-108)  mmol/L


 


Carbon Dioxide  8 L   17 L  (21-32)  mmol/L


 


Anion Gap  27.2 H   15.1 H  (5.0-14.0)  mmol/L


 


BUN  13   11  (7-18)  mg/dL


 


Creatinine  0.7   0.7  (0.6-1.0)  mg/dL


 


Est Cr Clr Drug Dosing  123.93   123.93  mL/min


 


Estimated GFR (MDRD)  > 60   > 60  (>60)  


 


Glucose  216 H   141 H  ()  mg/dL


 


Calcium  8.4 L   8.3 L  (8.5-10.1)  mg/dL


 


Phosphorus   1.5 L   (2.5-4.9)  mg/dL


 


Magnesium   1.8   (1.8-2.4)  mg/dL


 


Total Bilirubin     (0.2-1.0)  mg/dL


 


AST     (15-37)  U/L


 


ALT     (12-78)  U/L


 


Alkaline Phosphatase     ()  U/L


 


Total Protein     (6.4-8.2)  g/dL


 


Albumin     (3.4-5.0)  g/dL


 


Globulin     (2.3-3.5)  g/dL


 


Albumin/Globulin Ratio     (1.2-2.2)  


 


Ketones     (NEGATIVE)  














  03/19/19 03/19/19 03/19/19 Range/Units





  02:22 04:05 04:05 


 


WBC    8.8  (4.5-11.0)  K/uL


 


RBC    4.90  (3.30-5.50)  M/uL


 


Hgb    13.3  D  (12.0-15.0)  g/dL


 


Hct    40.5  (36.0-48.0)  %


 


MCV    83  (80-98)  fL


 


MCH    27  (27-31)  pg


 


MCHC    33  (32-36)  %


 


Plt Count    229  (150-400)  K/uL


 


Neut % (Auto)    64  (36-66)  %


 


Lymph % (Auto)    26  (24-44)  %


 


Mono % (Auto)    9 H  (2-6)  %


 


Eos % (Auto)    0 L  (2-4)  %


 


Baso % (Auto)    0  (0-1)  %


 


Puncture Site     


 


ABG pH     (7.350-7.450)  


 


ABG pCO2     (35.0-42.0)  mmHg


 


ABG pO2     (75.0-100.0)  mmHg


 


ABG HCO3     (22.0-26.0)  mmol/L


 


ABG Total CO2     (21.0-25.0)  mmol/L


 


ABG O2 Saturation     (95.0-98.0)  %


 


ABG O2 Content     (15.0-23.0)  %vol


 


ABG Base Excess     mm/L


 


ABG Hemoglobin     (12.0-16.0)  g/dL


 


ABG Oxyhemoglobin     %


 


ABG Carboxyhemoglobin     (0.0-1.6)  %


 


ABG Methemoglobin     %


 


Abner Test     


 


O2 Delivery Device     


 


Sodium   138 L   (140-148)  mmol/L


 


Potassium  4.3  3.7   (3.6-5.2)  mmol/L


 


Chloride   108   (100-108)  mmol/L


 


Carbon Dioxide   17 L   (21-32)  mmol/L


 


Anion Gap   16.7 H   (5.0-14.0)  mmol/L


 


BUN   10   (7-18)  mg/dL


 


Creatinine   0.6   (0.6-1.0)  mg/dL


 


Est Cr Clr Drug Dosing   144.58   mL/min


 


Estimated GFR (MDRD)   > 60   (>60)  


 


Glucose   133 H   ()  mg/dL


 


Calcium   8.2 L   (8.5-10.1)  mg/dL


 


Phosphorus   1.8 L   (2.5-4.9)  mg/dL


 


Magnesium   1.6 L   (1.8-2.4)  mg/dL


 


Total Bilirubin     (0.2-1.0)  mg/dL


 


AST     (15-37)  U/L


 


ALT     (12-78)  U/L


 


Alkaline Phosphatase     ()  U/L


 


Total Protein     (6.4-8.2)  g/dL


 


Albumin     (3.4-5.0)  g/dL


 


Globulin     (2.3-3.5)  g/dL


 


Albumin/Globulin Ratio     (1.2-2.2)  


 


Ketones     (NEGATIVE)  














  03/19/19 03/19/19 Range/Units





  06:40 08:28 


 


WBC    (4.5-11.0)  K/uL


 


RBC    (3.30-5.50)  M/uL


 


Hgb    (12.0-15.0)  g/dL


 


Hct    (36.0-48.0)  %


 


MCV    (80-98)  fL


 


MCH    (27-31)  pg


 


MCHC    (32-36)  %


 


Plt Count    (150-400)  K/uL


 


Neut % (Auto)    (36-66)  %


 


Lymph % (Auto)    (24-44)  %


 


Mono % (Auto)    (2-6)  %


 


Eos % (Auto)    (2-4)  %


 


Baso % (Auto)    (0-1)  %


 


Puncture Site    


 


ABG pH    (7.350-7.450)  


 


ABG pCO2    (35.0-42.0)  mmHg


 


ABG pO2    (75.0-100.0)  mmHg


 


ABG HCO3    (22.0-26.0)  mmol/L


 


ABG Total CO2    (21.0-25.0)  mmol/L


 


ABG O2 Saturation    (95.0-98.0)  %


 


ABG O2 Content    (15.0-23.0)  %vol


 


ABG Base Excess    mm/L


 


ABG Hemoglobin    (12.0-16.0)  g/dL


 


ABG Oxyhemoglobin    %


 


ABG Carboxyhemoglobin    (0.0-1.6)  %


 


ABG Methemoglobin    %


 


Abner Test    


 


O2 Delivery Device    


 


Sodium   138 L  (140-148)  mmol/L


 


Potassium  3.7  3.5 L  (3.6-5.2)  mmol/L


 


Chloride   109 H  (100-108)  mmol/L


 


Carbon Dioxide   18 L  (21-32)  mmol/L


 


Anion Gap   14.5 H  (5.0-14.0)  mmol/L


 


BUN   9  (7-18)  mg/dL


 


Creatinine   0.6  (0.6-1.0)  mg/dL


 


Est Cr Clr Drug Dosing   144.58  mL/min


 


Estimated GFR (MDRD)   > 60  (>60)  


 


Glucose   128 H  ()  mg/dL


 


Calcium   8.3 L  (8.5-10.1)  mg/dL


 


Phosphorus    (2.5-4.9)  mg/dL


 


Magnesium    (1.8-2.4)  mg/dL


 


Total Bilirubin    (0.2-1.0)  mg/dL


 


AST    (15-37)  U/L


 


ALT    (12-78)  U/L


 


Alkaline Phosphatase    ()  U/L


 


Total Protein    (6.4-8.2)  g/dL


 


Albumin    (3.4-5.0)  g/dL


 


Globulin    (2.3-3.5)  g/dL


 


Albumin/Globulin Ratio    (1.2-2.2)  


 


Ketones    (NEGATIVE)  











Med Orders - Current: 


 Current Medications





Acetaminophen (Tylenol)  650 mg PO Q4H PRN


   PRN Reason: Pain (Mild 1-3)/fever


Dextrose/Water (Dextrose 50% In Water)  50 ml IVPUSH ONETIME PRN


   PRN Reason: Blood Glucose


Dextrose/Sodium Chloride (Dextrose 5%-1/2 Ns)  1,000 mls @ 150 mls/hr IV 

.CONTINUOUS PRN


   PRN Reason: Blood Glucose


   Last Admin: 03/19/19 07:21 Dose:  150 mls/hr


Insulin Human Regular 100 unit (/ Sodium Chloride)  100 mls @ 7.89 mls/hr IV 

TITRATE JUAN; Protocol


   Last Titration: 03/19/19 09:22 Dose:  0.03 units/kg/hr, 3 mls/hr


Sodium Chloride (Normal Saline)  2,000 mls @ 500 mls/hr IV .CONTINUOUS PRN


   PRN Reason: Blood Glucose


   Last Admin: 03/18/19 17:24 Dose:  500 mls/hr


Magnesium Sulfate (Magnesium Sulfate 2 Gm In Water 50 Ml)  50 mls @ 25 mls/hr 

IV ASDIRECTED PRN


   PRN Reason: low magnesium


Insulin Glargine (Lantus Solostar)  40 units SUBCUT BID JUAN


Lorazepam (Ativan)  0.5 mg IVPUSH Q2H PRN


   PRN Reason: Anxiety


Ondansetron HCl (Zofran)  4 mg IV Q4H PRN


   PRN Reason: Nausea/Vomiting


Pantoprazole Sodium (Protonix***)  40 mg PO ACBREAKFAST JUAN


   Last Admin: 03/19/19 08:05 Dose:  40 mg


Potassium Chloride (Potassium Chloride Solution)  40 meq PO NOW PRN


   PRN Reason: Hypokalemia


   Last Admin: 03/19/19 01:09 Dose:  40 meq


Potassium Chloride (Potassium Chloride Solution)  40 meq PO Q2H PRN


   PRN Reason: Hypokalemia


Potassium Chloride (Potassium Chloride Solution)  20 meq PO ASDIRECTED PRN


   PRN Reason: Hypokalemia


Senna/Docusate Sodium (Senna Plus)  1 tab PO BID PRN


   PRN Reason: Constipation


Sodium Chloride (Saline Flush)  10 ml FLUSH ASDIRECTED PRN


   PRN Reason: Keep Vein Open





Discontinued Medications





Acetaminophen (Tylenol)  650 mg PO NOW ONE


   Stop: 03/18/19 14:35


   Last Admin: 03/18/19 14:41 Dose:  650 mg


Sodium Chloride (Normal Saline)  1,000 mls @ 999 mls/hr IV .BOLUS ONE


   Stop: 03/18/19 14:47


   Last Admin: 03/18/19 13:58 Dose:  999 mls/hr


Sodium Chloride (Normal Saline)  1,000 mls @ 500 mls/hr IV ASDIRECTED JUAN


Insulin Human Regular 100 unit (/ Sodium Chloride)  100 mls @ 7.89 mls/hr IV 

TITRATE JUAN; Protocol


Insulin Human Regular 100 unit (/ Sodium Chloride)  100 mls @ 7.89 mls/hr IV 

TITRATE JUAN; Protocol


   Last Admin: 03/18/19 16:00 Dose:  0.1 units/kg/hr, 7.89 mls/hr


Magnesium Sulfate (Magnesium Sulfate 2 Gm In Water 50 Ml)  50 mls @ 25 mls/hr 

IV ONETIME PRN


   PRN Reason: low magnesium


   Last Admin: 03/19/19 09:09 Dose:  25 mls/hr


Insulin Human Regular (Humulin R)  8 unit IVPUSH ONETIME ONE


   Stop: 03/18/19 14:31


   Last Admin: 03/18/19 14:38 Dose:  8 unit


Metoclopramide HCl (Reglan)  10 mg IVPUSH ONETIME ONE


   Stop: 03/18/19 14:26


   Last Admin: 03/18/19 14:29 Dose:  10 mg


Ondansetron HCl (Zofran)  4 mg IVPUSH ONETIME ONE


   Stop: 03/18/19 13:48


   Last Admin: 03/18/19 13:59 Dose:  4 mg


Potassium Chloride (Potassium Chloride Solution)  20 meq PO NOW PRN


   PRN Reason: Hypokalemia


   Last Admin: 03/19/19 09:08 Dose:  20 meq


Sodium Chloride (Saline Flush)  10 ml FLUSH ASDIRECTED PRN


   PRN Reason: Keep Vein Open


   Last Admin: 03/18/19 14:07 Dose:  10 ml











- Exam


General: Alert, Oriented, Cooperative, Mild Distress


Lungs: Clear to Auscultation, Normal Respiratory Effort


Cardiovascular: Regular Rate, Regular Rhythm, No Murmurs


GI/Abdominal Exam: Soft, Non-Tender, No Organomegaly, No Distention


Extremities: Non-Tender, No Pedal Edema





- Problem List Review


Problem List Initiated/Reviewed/Updated: Yes





- My Orders


Last 24 Hours: 


My Active Orders





03/18/19 15:37


Resuscitation Status Routine 





03/18/19 16:22


Patient Status [ADT] Routine 


Ambulate [RC] QID 


Cardiac Monitoring [RC] Q6H 


Diabetes Education [RC] Click to Edit 


Intake and Output [RC] QSHIFT 


Notify Provider Laboratory Res [RC] ASDIRECTED 


Notify Provider Vital Signs [RC] ASDIRECTED 


Oxygen Therapy [RC] PRN 


Peripheral IV Care [RC] Q12H 


Pulse Oximetry [RC] CONTINUOUS 


Up With Assistance [RC] ASDIRECTED 


Up to Chair [RC] QID 


VTE/DVT Education [RC] Per Unit Routine 


Vital Signs [RC] Q1H 


Acetaminophen [Tylenol]   650 mg PO Q4H PRN 


Dextrose 5%-0.45% NaCl [Dextrose 5%-1/2 NS] 1,000 ml IV .CONTINUOUS 


Dextrose 50% in Water   50 ml IVPUSH ONETIME PRN 


Docusate Sodium/Sennosides [Senna Plus]   1 tab PO BID PRN 


LORazepam [Ativan]   0.5 mg IVPUSH Q2H PRN 


Ondansetron [Zofran]   4 mg IV Q4H PRN 


Potassium Chloride [Potassium Chloride Solution]   40 meq PO NOW PRN 


Potassium Chloride [Potassium Chloride Solution]   40 meq PO Q2H PRN 


Sodium Chloride 0.9% [Normal Saline] 2,000 ml IV .CONTINUOUS 


Sodium Chloride 0.9% [Saline Flush]   10 ml FLUSH ASDIRECTED PRN 


Medication Continuation Instructions [OM.PC] ASDIRECTED 


Medication Discontinuation Instructions [OM.PC] ASDIRECTED 


Peripheral IV Insertion Adult [OM.PC] Routine 


Sequential Compression Device [OM.PC] Per Unit Routine 





03/18/19 16:50


Insulin Regular, Human [HumuLIN R] 100 unit   Sodium Chloride 0.9% [Normal 

Saline] 100 ml IV TITRATE 





03/18/19 18:00


Pantoprazole [ProTONIX***]   40 mg PO ACBREAKFAST 





03/18/19 Lunch


Consistent Carbohydrate Diet [DIET] 





03/19/19 09:03


Potassium Chloride [Potassium Chloride Solution]   20 meq PO ASDIRECTED PRN 





03/19/19 09:04


Magnesium Sulfate/Water [Magnesium Sulfate 2 GM in Water 50 ML] 50 ml IV 

ASDIRECTED 





03/19/19 10:00


Insulin Glarg,Human.Rec.Analog [LantUS Solostar]   40 units SUBCUT BID 





03/19/19 10:22


MAGNESIUM [CHEM] Q6H 


PHOSPHORUS [CHEM] Q6H 


POTASSIUM,K [CHEM] Q2H 





03/19/19 12:22


BASIC METABOLIC PANEL,BMP [CHEM] Q4H 





03/19/19 14:22


POTASSIUM,K [CHEM] Q2H 





03/20/19 05:00


BASIC METABOLIC PANEL,BMP [CHEM] Timed 


MAGNESIUM [CHEM] Timed 














- Plan


Plan:: 





ASSESSMENT AND PLAN





DIABETIC KETOACIDOSIS-good improvement since admission, ketoacidosis has not 

totally resolved yet. Glucose levels have been within good range over the past 

several hours.


-Vigorous IV fluid replacement per protocol


-IV insulins per protocol


-Frequent monitoring of electrolytes and replacement per protocol


-At the time of discharge arrange for primary care and frequent monitoring of 

diabetes





DRUG ABUSE-on admission she expresses interest in treatment of her drug 

addiction with methamphetamine and marijuana





MAINTENANCE ISSUES


-DVT prophylaxis; SCUDs


-GI prophylaxis; Protonix 40 mg by mouth daily


-Franco catheter; not indicated


-Nutrition; consistent carb diet


-Nicotine dependence; not required





CODE STATUS-FULL CODE





ADMISSION STATUS-patient will be admitted to inpatient status, expect at least 

a 2 night hospital stay for evaluation and management of problems as outlined 

above.  At the time of this admission I do not reasonably expected evaluation 

and management of this problem will require more than a 96 hour hospital stay.





DISPOSITION-anticipate discharge to home after the hospital stay.





PRIMARY CARE PROVIDER-she does not currently have a primary care provider

## 2019-03-20 VITALS — SYSTOLIC BLOOD PRESSURE: 106 MMHG | DIASTOLIC BLOOD PRESSURE: 58 MMHG

## 2019-03-20 RX ADMIN — INSULIN GLARGINE SCH UNITS: 100 INJECTION, SOLUTION SUBCUTANEOUS at 08:04

## 2019-03-20 RX ADMIN — INSULIN LISPRO SCH UNITS: 100 INJECTION, SOLUTION INTRAVENOUS; SUBCUTANEOUS at 10:07

## 2019-03-20 RX ADMIN — INSULIN LISPRO SCH: 100 INJECTION, SOLUTION INTRAVENOUS; SUBCUTANEOUS at 07:18

## 2019-03-20 NOTE — [id number]
**Discharge Summary





- Hospital Course


Brief History: Ms. Guo is a 28-year-old woman with type 1 diabetes 

mellitus. She was admitted through the emergency department with diabetic 

ketoacidosis.





- Discharge Data


Discharge Date: 03/20/19


Discharge Disposition: Home, Self-Care 01


Condition: Fair





- Discharge Diagnosis/Problem(s)


(1) Diabetic ketoacidosis


SNOMED Code(s): 160594991, 643717448


   ICD Code: E13.10 - OTH DIABETES MELLITUS WITH KETOACIDOSIS WITHOUT COMA   

Status: Acute   Current Visit: Yes   


Qualifiers: 


   Diabetes mellitus type: other specified (including NISHI)   Diabetes mellitus 

complication detail: without coma   Qualified Code(s): E13.10 - Other specified 

diabetes mellitus with ketoacidosis without coma   





(2) Methamphetamine abuse


SNOMED Code(s): 274858631


   ICD Code: F15.10 - OTHER STIMULANT ABUSE, UNCOMPLICATED   Status: Chronic   

Current Visit: No   





(3) Type 1 diabetes mellitus


SNOMED Code(s): 89847443


   ICD Code: E10.9 - TYPE 1 DIABETES MELLITUS WITHOUT COMPLICATIONS   Status: 

Chronic   Current Visit: No   





- Patient Summary/Data


Hospital Course: 





Ms. Guo is a 28-year-old woman who was admitted through the emergency 

department with weakness, nausea, and vomiting secondary to diabetic 

ketoacidosis. She has a known and long-standing history of type 1 diabetes 

mellitus, with ongoing poor control. She has had recurrent admissions for 

ketoacidosis, because she runs out of the insulin. She also has a known history 

of drug abuse and admits to recently taking methamphetamine and marijuana on a 

daily basis. She ran out of all of her insulin approximately 3 days ago and has 

become progressively more weak since then. On evaluation in the emergency 

department glucose level is elevated at 360 and she has obvious evidence of 

ketoacidosis with a pH of 7.15, carbon dioxide level of 8 and an anion gap of 

31.





On admission she was treated with vigorous IV fluid replacement per 

ketoacidosis protocol. Insulin bolus dose was given in the emergency department 

and she was started on a continuous infusion of insulin also per protocol. 

Electrolytes including magnesium, potassium, and phosphorus were monitored 

frequently and replaced as needed. After 24 hours of insulin infusion glucose 

levels were within desired range and ketoacidosis has essentially resolved. She 

will be seen by diabetes educator prior to discharge. A rule 25 assessment will 

be scheduled for her on an outpatient basis, she reports that she is interested 

in obtaining help for her drug use. Follow-up appointment will be scheduled 

with her primary care provider within one week. Activity will be as tolerated 

and she will be on a diabetic diet.





- Patient Instructions


Diet: Diabetic Diet


Activity: As Tolerated


Other/Special Instructions: Please schedule outpatient rule 25 assessment. 

Please schedule follow-up appointment with primary care provider within one 

week. Please have diabetes educator see the patient prior to discharge.





- Discharge Plan


*PRESCRIPTION DRUG MONITORING PROGRAM REVIEWED*: Not Applicable


*COPY OF PRESCRIPTION DRUG MONITORING REPORT IN PATIENT SHELIA: Not Applicable


Home Medications: 


 Home Meds





Insulin Aspart [Novolog Flexpen] 10 unit SQ ASDIRECTED 01/03/15 [History]


Insulin Detemir [Levemir] 40 unit SQ BID 01/03/15 [History]








Referrals: 


Preeti Power NP [Primary Care Provider] - 





- Discharge Summary/Plan Comment


DC Time >30 min.: No





- Patient Data


Vitals - Most Recent: 


 Last Vital Signs











Temp  96.4 F   03/20/19 07:48


 


Pulse  88   03/20/19 07:48


 


Resp  15   03/20/19 07:48


 


BP  106/58 L  03/20/19 07:48


 


Pulse Ox  99   03/20/19 07:48











Weight - Most Recent: 173 lb 15.997 oz


I&O - Last 24 hours: 


 Intake & Output











 03/19/19 03/20/19 03/20/19





 22:59 06:59 14:59


 


Intake Total 600 1500 


 


Output Total 1400  


 


Balance -800 1500 











Lab Results - Last 24 hrs: 


 Laboratory Results - last 24 hr











  03/19/19 03/19/19 03/19/19 Range/Units





  10:25 12:40 14:22 


 


Sodium   137 L   (140-148)  mmol/L


 


Potassium  3.7  3.8  4.1  (3.6-5.2)  mmol/L


 


Chloride   108   (100-108)  mmol/L


 


Carbon Dioxide   18 L   (21-32)  mmol/L


 


Anion Gap   14.8 H   (5.0-14.0)  mmol/L


 


BUN   7   (7-18)  mg/dL


 


Creatinine   0.6   (0.6-1.0)  mg/dL


 


Est Cr Clr Drug Dosing   144.06   mL/min


 


Estimated GFR (MDRD)   > 60   (>60)  


 


Glucose   238 H   ()  mg/dL


 


Calcium   7.7 L   (8.5-10.1)  mg/dL


 


Phosphorus  1.4 L    (2.5-4.9)  mg/dL


 


Magnesium  2.0    (1.8-2.4)  mg/dL














  03/19/19 03/20/19 Range/Units





  16:10 04:19 


 


Sodium  139 L  142  (140-148)  mmol/L


 


Potassium  3.5 L  3.3 L  (3.6-5.2)  mmol/L


 


Chloride  110 H  109 H  (100-108)  mmol/L


 


Carbon Dioxide  19 L  24  (21-32)  mmol/L


 


Anion Gap  13.5  12.3  (5.0-14.0)  mmol/L


 


BUN  6 L  8  (7-18)  mg/dL


 


Creatinine  0.7  0.4 L  (0.6-1.0)  mg/dL


 


Est Cr Clr Drug Dosing  123.48  216.10  mL/min


 


Estimated GFR (MDRD)  > 60  > 60  (>60)  


 


Glucose  105  120 H  ()  mg/dL


 


Calcium  8.1 L  8.0 L  (8.5-10.1)  mg/dL


 


Phosphorus    (2.5-4.9)  mg/dL


 


Magnesium  1.9  1.7 L  (1.8-2.4)  mg/dL











Med Orders - Current: 


 Current Medications





Acetaminophen (Tylenol)  650 mg PO Q4H PRN


   PRN Reason: Pain (Mild 1-3)/fever


Dextrose (Glutose 15)  15 gm PO ONETIME PRN


   PRN Reason: Hypoglycemia


Dextrose/Water (Dextrose 50% In Water)  50 ml IVPUSH ONETIME PRN


   PRN Reason: Blood Glucose


Insulin Glargine (Lantus Solostar)  40 units SUBCUT BID [facility]


   Last Admin: 03/20/19 08:04 Dose:  40 units


Insulin Human Lispro (Humalog)  0 unit SUBCUT QIDACANDBED [facility]; Protocol


   Last Admin: 03/20/19 07:18 Dose:  Not Given


Lorazepam (Ativan)  0.5 mg IVPUSH Q2H PRN


   PRN Reason: Anxiety


Ondansetron HCl (Zofran)  4 mg IV Q4H PRN


   PRN Reason: Nausea/Vomiting


Pantoprazole Sodium (Protonix***)  40 mg PO ACBREAKFAST [facility]


   Last Admin: 03/20/19 07:45 Dose:  40 mg


Senna/Docusate Sodium (Senna Plus)  1 tab PO BID PRN


   PRN Reason: Constipation


Sodium Chloride (Saline Flush)  10 ml FLUSH ASDIRECTED PRN


   PRN Reason: Keep Vein Open





Discontinued Medications





Acetaminophen (Tylenol)  650 mg PO NOW ONE


   Stop: 03/18/19 14:35


   Last Admin: 03/18/19 14:41 Dose:  650 mg


Sodium Chloride (Normal Saline)  1,000 mls @ 999 mls/hr IV .BOLUS ONE


   Stop: 03/18/19 14:47


   Last Admin: 03/18/19 13:58 Dose:  999 mls/hr


Sodium Chloride (Normal Saline)  1,000 mls @ 500 mls/hr IV ASDIRECTED [facility]


Insulin Human Regular 100 unit (/ Sodium Chloride)  100 mls @ 7.89 mls/hr IV 

TITRATE [facility]; Protocol


Insulin Human Regular 100 unit (/ Sodium Chloride)  100 mls @ 7.89 mls/hr IV 

TITRATE [facility]UAN; Protocol


   Last Admin: 03/18/19 16:00 Dose:  0.1 units/kg/hr, 7.89 mls/hr


Dextrose/Sodium Chloride (Dextrose 5%-1/2 Ns)  1,000 mls @ 150 mls/hr IV 

.CONTINUOUS PRN


   PRN Reason: Blood Glucose


   Last Admin: 03/19/19 14:11 Dose:  150 mls/hr


Insulin Human Regular 100 unit (/ Sodium Chloride)  100 mls @ 7.89 mls/hr IV 

TITRATE [facility]; Protocol


   Last Titration: 03/19/19 16:08 Dose:  0.09 units/kg/hr, 7.5 mls/hr


Magnesium Sulfate (Magnesium Sulfate 2 Gm In Water 50 Ml)  50 mls @ 25 mls/hr 

IV ONETIME PRN


   PRN Reason: low magnesium


   Last Admin: 03/19/19 09:09 Dose:  25 mls/hr


Sodium Chloride (Normal Saline)  2,000 mls @ 500 mls/hr IV .CONTINUOUS PRN


   PRN Reason: Blood Glucose


   Last Admin: 03/18/19 17:24 Dose:  500 mls/hr


Magnesium Sulfate (Magnesium Sulfate 2 Gm In Water 50 Ml)  50 mls @ 25 mls/hr 

IV ASDIRECTED PRN


   PRN Reason: low magnesium


Magnesium Sulfate 2 gm/ Premix  50 mls @ 25 mls/hr IV ONETIME ONE


   Stop: 03/20/19 07:18


   Last Admin: 03/20/19 05:57 Dose:  25 mls/hr


Insulin Human Regular (Humulin R)  8 unit IVPUSH ONETIME ONE


   Stop: 03/18/19 14:31


   Last Admin: 03/18/19 14:38 Dose:  8 unit


Metoclopramide HCl (Reglan)  10 mg IVPUSH ONETIME ONE


   Stop: 03/18/19 14:26


   Last Admin: 03/18/19 14:29 Dose:  10 mg


Ondansetron HCl (Zofran)  4 mg IVPUSH ONETIME ONE


   Stop: 03/18/19 13:48


   Last Admin: 03/18/19 13:59 Dose:  4 mg


Potassium Chloride (Potassium Chloride Solution)  20 meq PO NOW PRN


   PRN Reason: Hypokalemia


   Last Admin: 03/19/19 09:08 Dose:  20 meq


Potassium Chloride (Potassium Chloride Solution)  40 meq PO NOW PRN


   PRN Reason: Hypokalemia


   Last Admin: 03/19/19 01:09 Dose:  40 meq


Potassium Chloride (Potassium Chloride Solution)  40 meq PO Q2H PRN


   PRN Reason: Hypokalemia


Potassium Chloride (Potassium Chloride Solution)  20 meq PO ASDIRECTED PRN


   PRN Reason: Hypokalemia


   Last Admin: 03/19/19 13:25 Dose:  20 meq


Potassium Chloride (Klor-Con M20)  40 meq PO ONETIME ONE


   Stop: 03/19/19 17:01


   Last Admin: 03/19/19 16:47 Dose:  40 meq


Potassium Chloride (Klor-Con M20)  40 meq PO ONETIME ONE


   Stop: 03/20/19 05:20


   Last Admin: 03/20/19 05:57 Dose:  40 meq


Sodium Chloride (Saline Flush)  10 ml FLUSH ASDIRECTED PRN


   PRN Reason: Keep Vein Open


   Last Admin: 03/18/19 14:07 Dose:  10 ml











- Exam


General: Reports: Alert, Oriented, Cooperative, No Acute Distress


Lungs: Reports: Clear to Auscultation, Normal Respiratory Effort


Cardiovascular: Reports: Regular Rate, Regular Rhythm, No Murmurs


GI/Abdominal Exam: Soft, Non-Tender, No Organomegaly, No Distention


Extremities: Non-Tender, No Pedal Edema

## 2019-12-30 NOTE — CRLCR
INDICATION:



Fever



TECHNIQUE:



Chest 1 views



COMPARISON:



Chest x-ray 02/19/2018



FINDINGS:



Cardiovascular and mediastinum:  Heart size and vasculature are normal in 

caliber and appearance.  



Lungs and pleural spaces:  Lungs are clear.  No sign of infiltrate or mass. 

 No sign of pleural effusion.  No pneumothorax.  



Bones and soft tissues:  No significant findings.



IMPRESSION:



No acute findings and no significant changes from the prior exam.



Dictated by Gato Crowley MD @ Dec 30 2019 11:41PM



Signed by Dr. Gato Crowley @ Dec 30 2019 11:42PM

## 2019-12-30 NOTE — EDM.PDOC
ED HPI GENERAL MEDICAL PROBLEM





- General


Chief Complaint: Diabetic Complaint


Stated Complaint: MEDICAL


Time Seen by Provider: 12/30/19 22:12


Source of Information: Reports: Patient, Family, RN Notes Reviewed


History Limitations: Reports: No Limitations





- History of Present Illness


INITIAL COMMENTS - FREE TEXT/NARRATIVE: 





29-year-old patient presents emergency department today concerned about DKA, 

she has known history of diabetes mellitus type 1 is insulin-dependent both 

short and long-acting unfortunately she states she was recently incarcerated 

and when she was discharged she did not receive her long-acting insulin she has 

been out of that medication for some time she is trying to follow-up with her 

primary care this week.  She states she has been able to take her short acting 

insulin.  She does admit to fever today which did respond to Tylenol denies any 

other specific symptoms





- Related Data


 Allergies











Allergy/AdvReac Type Severity Reaction Status Date / Time


 


ibuprofen Allergy Unknown Swelling Verified 12/30/19 22:19











Home Meds: 


 Home Meds





Insulin Aspart [Novolog Flexpen] 10 unit SQ ASDIRECTED 01/03/15 [History]


Insulin Detemir [Levemir] 40 unit SQ BID 01/03/15 [History]











Past Medical History


Gastrointestinal History: Reports: Cholelithiasis, Other (See Below)


Other Gastrointestinal History: 90# weight loss in past year


Musculoskeletal History: Reports: Other (See Below)


Other Musculoskeletal History: chronic left knee pain


Psychiatric History: Reports: Addiction


Endocrine/Metabolic History: Reports: Diabetes, Type I, IDDM


Hematologic History: Reports: Anemia


Other Dermatologic History: boils on buttock and abdomin





- Past Surgical History


GI Surgical History: Reports: Cholecystectomy





Social & Family History





- Caffeine Use


Caffeine Use: Reports: Coffee





- Living Situation & Occupation


Living situation: Reports: Single





ED ROS GENERAL





- Review of Systems


Review Of Systems: See Below


Constitutional: Reports: Fever, Weakness


HEENT: Reports: No Symptoms


Respiratory: Reports: No Symptoms


Cardiovascular: Reports: No Symptoms


Endocrine: Reports: High Glucose


GI/Abdominal: Reports: Nausea


: Reports: No Symptoms


Musculoskeletal: Reports: Muscle Pain


Skin: Reports: No Symptoms


Neurological: Reports: No Symptoms





ED EXAM GENERAL NO PERIP PULSE





- Physical Exam


Exam: See Below


Text/Narrative:: 





General: Female, ill-appearing, alert and oriented x3 HEENT: head is atraumatic 

normocephalic, eyes pupils equal round reactive to light, sclera clear no 

conjunctivitis appreciated.  Ears tympanic membranes clear and gray landmarks 

and light reflex are present bilaterally canals are clear.  Nose no septal 

deviation, nares are clear, no blood present.  Mouth mucosa is dry and pink no 

erythema or exudate noted in soft palate, tongue is midline uvula is midline, 

dentition is poor .


Neck: Supple no thyromegaly no tracheal deviation.


Nodes: Cervical nodes subclavicular nodes nontender no palpable lymphadenopathy 

noted.


Lungs: clear to auscultation bilaterally with symmetrical respirations, no 

adventitious noise appreciated.


CV: Regular rate and rhythm S1 and S2 appreciated no murmurs rubs or gallops 

noted.


Abdomen: Soft, nontender, no palpable masses or organomegaly appreciated, no 

distention no guarding bowel sounds are present, [scars ].


Neuro: GCS 15


Skin: Warm and dry, intact


Extremities: No lower extremity edema appreciated, 





Course





- Vital Signs


Last Recorded V/S: 


 Last Vital Signs











Temp  99.0 F   12/30/19 23:06


 


Pulse  126 H  12/30/19 23:06


 


Resp  22 H  12/30/19 23:06


 


BP  143/93 H  12/30/19 23:06


 


Pulse Ox  98   12/30/19 23:06














- Orders/Labs/Meds


Orders: 


 Active Orders 24 hr











 Category Date Time Status


 


 Blood Glucose Check, Bedside [RC] STAT Care  12/30/19 22:15 Active


 


 Cardiac Monitoring [RC] CONTINUOUS Care  12/30/19 22:15 Active


 


 Communication Order [RC] STAT Care  12/30/19 22:15 Active


 


 Communication Order [RC] STAT Care  12/30/19 22:15 Active


 


 Oxygen Therapy, ED [RC] PRN Care  12/30/19 22:15 Active


 


 Peripheral IV Care [RC] .AS DIRECTED Care  12/30/19 22:15 Active


 


 Peripheral IV Care [RC] .AS DIRECTED Care  12/30/19 22:25 Active


 


 Chest 1V Frontal [CR] Stat Exams  12/30/19 22:15 Ordered


 


 CULTURE BLOOD [BC] Urgent Lab  12/30/19 22:20 Received


 


 CULTURE BLOOD [BC] Urgent Lab  12/30/19 22:35 Received


 


 GLUCOSE POC LAB TO COLLECT [POC] Stat Lab  12/31/19 00:30 Ordered


 


 GLYCOSYLATED HEMOGLOBIN,HGBA1C [CHEM] Stat Lab  12/30/19 22:15 Ordered


 


 HCG QUALITATIVE,URINE [URCHEM] Stat Lab  12/30/19 22:11 Ordered


 


 KETONES,URINE [URIN] Stat Lab  12/30/19 22:15 Ordered


 


 UA W/MICROSCOPIC [URIN] Urgent Lab  12/30/19 22:11 Ordered


 


 Insulin Regular, Human [HumuLIN R] 100 unit Med  12/30/19 23:30 Ordered





 Sodium Chloride 0.9% [Normal Saline] 99 ml   





 IV TITRATE   


 


 Sodium Chloride 0.9% [Normal Saline] 1,000 ml Med  12/30/19 22:30 Active





 IV ASDIRECTED   


 


 Sodium Chloride 0.9% [Normal Saline] 1,000 ml Med  12/30/19 23:30 Ordered





 IV ASDIRECTED   


 


 Sodium Chloride 0.9% [Saline Flush] Med  12/30/19 22:15 Active





 10 ml FLUSH ASDIRECTED PRN   


 


 Sodium Chloride 0.9% [Saline Flush] Med  12/30/19 22:25 Active





 10 ml FLUSH ASDIRECTED PRN   


 


 Blood Culture x2 Reflex Set [OM.PC] Urgent Oth  12/30/19 22:15 Ordered


 


 Peripheral IV Insertion Adult [OM.PC] Stat Oth  12/30/19 22:15 Ordered


 


 Peripheral IV Insertion Adult [OM.PC] Urgent Oth  12/30/19 22:25 Ordered


 


 Resuscitation Status Stat Resus Stat  12/30/19 22:15 Ordered








 Medication Orders





Sodium Chloride (Normal Saline)  1,000 mls @ 999 mls/hr IV ASDIRECTED JUAN


   Last Admin: 12/30/19 22:29  Dose: 999 mls/hr


Insulin Human Regular 100 unit (/ Sodium Chloride)  100 mls @ 8 mls/hr IV 

TITRATE JUAN; Protocol


Sodium Chloride (Normal Saline)  1,000 mls @ 999 mls/hr IV ASDIRECTED JUAN


   Last Admin: 12/30/19 23:24  Dose: 999 mls/hr


Sodium Chloride (Saline Flush)  10 ml FLUSH ASDIRECTED PRN


   PRN Reason: Keep Vein Open


   Last Admin: 12/30/19 22:20  Dose: 10 ml


Sodium Chloride (Saline Flush)  10 ml FLUSH ASDIRECTED PRN


   PRN Reason: Keep Vein Open








Labs: 


 Laboratory Tests











  12/30/19 12/30/19 12/30/19 Range/Units





  22:10 22:15 22:18 


 


WBC     (4.5-11.0)  K/uL


 


RBC     (3.30-5.50)  M/uL


 


Hgb     (12.0-15.0)  g/dL


 


Hct     (36.0-48.0)  %


 


MCV     (80-98)  fL


 


MCH     (27-31)  pg


 


MCHC     (32-36)  %


 


Plt Count     (150-400)  K/uL


 


Neut % (Auto)     (36-66)  %


 


Lymph % (Auto)     (24-44)  %


 


Mono % (Auto)     (2-6)  %


 


Eos % (Auto)     (2-4)  %


 


Baso % (Auto)     (0-1)  %


 


Puncture Site     


 


ABG pH     (7.350-7.450)  


 


ABG pCO2     (35.0-42.0)  mmHg


 


ABG pO2     (75.0-100.0)  mmHg


 


ABG HCO3     (22.0-26.0)  mmol/L


 


ABG Total CO2     (21.0-25.0)  mmol/L


 


ABG O2 Saturation     (95.0-98.0)  %


 


ABG O2 Content     (15.0-23.0)  %vol


 


ABG Base Excess     mm/L


 


ABG Hemoglobin     (12.0-16.0)  g/dL


 


ABG Oxyhemoglobin     %


 


ABG Carboxyhemoglobin     (0.0-1.6)  %


 


ABG Methemoglobin     %


 


Abner Test     


 


O2 Delivery Device     


 


Sodium  130 L    (140-148)  mmol/L


 


Potassium  4.2    (3.6-5.2)  mmol/L


 


Chloride  97 L    (100-108)  mmol/L


 


Carbon Dioxide  4 L    (21-32)  mmol/L


 


Anion Gap  33.2 H    (5.0-14.0)  mmol/L


 


BUN  11    (7-18)  mg/dL


 


Creatinine  0.7  D    (0.6-1.0)  mg/dL


 


Est Cr Clr Drug Dosing  127.08    mL/min


 


Estimated GFR (MDRD)  > 60    (>60)  


 


Glucose  363 H    ()  mg/dL


 


Lactic Acid     (0.4-2.0)  mmol/L


 


Calcium  8.6    (8.5-10.1)  mg/dL


 


Phosphorus   3.4   (2.5-4.9)  mg/dL


 


Magnesium  1.9    (1.8-2.4)  mg/dL


 


Total Bilirubin  0.4    (0.2-1.0)  mg/dL


 


AST  12 L    (15-37)  U/L


 


ALT  11 L    (12-78)  U/L


 


Alkaline Phosphatase  133 H    ()  U/L


 


Creatine Kinase     ()  U/L


 


Total Protein  7.9    (6.4-8.2)  g/dL


 


Albumin  3.7    (3.4-5.0)  g/dL


 


Globulin  4.2 H    (2.3-3.5)  g/dL


 


Albumin/Globulin Ratio  0.9 L    (1.2-2.2)  


 


Amylase  34  D    ()  U/L


 


Lipase  94    ()  U/L


 


TSH, Ultra Sensitive  0.725    (0.358-3.740)  uIU/mL


 


Ketones    Moderate H  (NEGATIVE)  














  12/30/19 12/30/19 12/30/19 Range/Units





  22:20 22:20 22:20 


 


WBC  15.0 H    (4.5-11.0)  K/uL


 


RBC  5.63 H    (3.30-5.50)  M/uL


 


Hgb  14.9    (12.0-15.0)  g/dL


 


Hct  46.0    (36.0-48.0)  %


 


MCV  82    (80-98)  fL


 


MCH  27    (27-31)  pg


 


MCHC  32    (32-36)  %


 


Plt Count  207    (150-400)  K/uL


 


Neut % (Auto)  81 H    (36-66)  %


 


Lymph % (Auto)  13 L    (24-44)  %


 


Mono % (Auto)  6    (2-6)  %


 


Eos % (Auto)  0 L    (2-4)  %


 


Baso % (Auto)  0    (0-1)  %


 


Puncture Site   Rt radial   


 


ABG pH   7.150 L*   (7.350-7.450)  


 


ABG pCO2   12.1 L*   (35.0-42.0)  mmHg


 


ABG pO2   117.0 H   (75.0-100.0)  mmHg


 


ABG HCO3   4.0 L   (22.0-26.0)  mmol/L


 


ABG Total CO2   3.8 L   (21.0-25.0)  mmol/L


 


ABG O2 Saturation   96.8   (95.0-98.0)  %


 


ABG O2 Content   20.0   (15.0-23.0)  %vol


 


ABG Base Excess   -24.5   mm/L


 


ABG Hemoglobin   14.9   (12.0-16.0)  g/dL


 


ABG Oxyhemoglobin   94.6   %


 


ABG Carboxyhemoglobin   1.0   (0.0-1.6)  %


 


ABG Methemoglobin   1.3   %


 


Abner Test   Pass   


 


O2 Delivery Device   Room air   


 


Sodium     (140-148)  mmol/L


 


Potassium     (3.6-5.2)  mmol/L


 


Chloride     (100-108)  mmol/L


 


Carbon Dioxide     (21-32)  mmol/L


 


Anion Gap     (5.0-14.0)  mmol/L


 


BUN     (7-18)  mg/dL


 


Creatinine     (0.6-1.0)  mg/dL


 


Est Cr Clr Drug Dosing     mL/min


 


Estimated GFR (MDRD)     (>60)  


 


Glucose     ()  mg/dL


 


Lactic Acid    0.9  (0.4-2.0)  mmol/L


 


Calcium     (8.5-10.1)  mg/dL


 


Phosphorus     (2.5-4.9)  mg/dL


 


Magnesium     (1.8-2.4)  mg/dL


 


Total Bilirubin     (0.2-1.0)  mg/dL


 


AST     (15-37)  U/L


 


ALT     (12-78)  U/L


 


Alkaline Phosphatase     ()  U/L


 


Creatine Kinase     ()  U/L


 


Total Protein     (6.4-8.2)  g/dL


 


Albumin     (3.4-5.0)  g/dL


 


Globulin     (2.3-3.5)  g/dL


 


Albumin/Globulin Ratio     (1.2-2.2)  


 


Amylase     ()  U/L


 


Lipase     ()  U/L


 


TSH, Ultra Sensitive     (0.358-3.740)  uIU/mL


 


Ketones     (NEGATIVE)  














  12/30/19 Range/Units





  22:22 


 


WBC   (4.5-11.0)  K/uL


 


RBC   (3.30-5.50)  M/uL


 


Hgb   (12.0-15.0)  g/dL


 


Hct   (36.0-48.0)  %


 


MCV   (80-98)  fL


 


MCH   (27-31)  pg


 


MCHC   (32-36)  %


 


Plt Count   (150-400)  K/uL


 


Neut % (Auto)   (36-66)  %


 


Lymph % (Auto)   (24-44)  %


 


Mono % (Auto)   (2-6)  %


 


Eos % (Auto)   (2-4)  %


 


Baso % (Auto)   (0-1)  %


 


Puncture Site   


 


ABG pH   (7.350-7.450)  


 


ABG pCO2   (35.0-42.0)  mmHg


 


ABG pO2   (75.0-100.0)  mmHg


 


ABG HCO3   (22.0-26.0)  mmol/L


 


ABG Total CO2   (21.0-25.0)  mmol/L


 


ABG O2 Saturation   (95.0-98.0)  %


 


ABG O2 Content   (15.0-23.0)  %vol


 


ABG Base Excess   mm/L


 


ABG Hemoglobin   (12.0-16.0)  g/dL


 


ABG Oxyhemoglobin   %


 


ABG Carboxyhemoglobin   (0.0-1.6)  %


 


ABG Methemoglobin   %


 


Abner Test   


 


O2 Delivery Device   


 


Sodium   (140-148)  mmol/L


 


Potassium   (3.6-5.2)  mmol/L


 


Chloride   (100-108)  mmol/L


 


Carbon Dioxide   (21-32)  mmol/L


 


Anion Gap   (5.0-14.0)  mmol/L


 


BUN   (7-18)  mg/dL


 


Creatinine   (0.6-1.0)  mg/dL


 


Est Cr Clr Drug Dosing   mL/min


 


Estimated GFR (MDRD)   (>60)  


 


Glucose   ()  mg/dL


 


Lactic Acid   (0.4-2.0)  mmol/L


 


Calcium   (8.5-10.1)  mg/dL


 


Phosphorus   (2.5-4.9)  mg/dL


 


Magnesium   (1.8-2.4)  mg/dL


 


Total Bilirubin   (0.2-1.0)  mg/dL


 


AST   (15-37)  U/L


 


ALT   (12-78)  U/L


 


Alkaline Phosphatase   ()  U/L


 


Creatine Kinase  28  ()  U/L


 


Total Protein   (6.4-8.2)  g/dL


 


Albumin   (3.4-5.0)  g/dL


 


Globulin   (2.3-3.5)  g/dL


 


Albumin/Globulin Ratio   (1.2-2.2)  


 


Amylase   ()  U/L


 


Lipase   ()  U/L


 


TSH, Ultra Sensitive   (0.358-3.740)  uIU/mL


 


Ketones   (NEGATIVE)  











Meds: 


Medications











Generic Name Dose Route Start Last Admin





  Trade Name Freq  PRN Reason Stop Dose Admin


 


Sodium Chloride  1,000 mls @ 999 mls/hr  12/30/19 22:30  12/30/19 22:29





  Normal Saline  IV   999 mls/hr





  ASDIRECTED JUAN   Administration





     





     





     





     


 


Insulin Human Regular 100 unit  100 mls @ 8 mls/hr  12/30/19 23:30  





  / Sodium Chloride  IV   





  TITRATE JUAN   





     





     





  Protocol   





  8 UNIT/HR   


 


Sodium Chloride  1,000 mls @ 999 mls/hr  12/30/19 23:30  12/30/19 23:24





  Normal Saline  IV   999 mls/hr





  ASDIRECTED JUAN   Administration





     





     





     





     


 


Sodium Chloride  10 ml  12/30/19 22:15  12/30/19 22:20





  Saline Flush  FLUSH   10 ml





  ASDIRECTED PRN   Administration





  Keep Vein Open   





     





     





     


 


Sodium Chloride  10 ml  12/30/19 22:25  





  Saline Flush  FLUSH   





  ASDIRECTED PRN   





  Keep Vein Open   





     





     





     














Discontinued Medications














Generic Name Dose Route Start Last Admin





  Trade Name Freq  PRN Reason Stop Dose Admin


 


Fentanyl  50 mcg  12/30/19 22:52  12/30/19 23:00





  Sublimaze  IVPUSH  12/30/19 22:53  50 mcg





  ONETIME ONE   Administration





     





     





     





     


 


Ondansetron HCl  4 mg  12/30/19 22:22  12/30/19 22:29





  Zofran  IVPUSH  12/30/19 22:23  4 mg





  ONETIME ONE   Administration





     





     





     





     


 


Prochlorperazine Edisylate  5 mg  12/30/19 22:46  12/30/19 22:59





  Compazine  IVPUSH  12/30/19 22:47  5 mg





  ONETIME ONE   Administration





     





     





     





     














Departure





- Departure


Time of Disposition: 23:39


Disposition: DC/[facility]fer to [facility]


Condition: Critical


Clinical Impression: 


Diabetic ketoacidosis


Qualifiers:


 Diabetes mellitus type: type 1 Diabetes mellitus complication detail: without 

coma Qualified Code(s): E10.10 - Type 1 diabetes mellitus with ketoacidosis 

without coma








- Discharge Information


Referrals: 


PCP,None [Primary Care Provider] - 


Forms:  ED Department Discharge





Critical Care Note





- Critical Care Note


Total Time (mins): 30





Sepsis Event Note





- Focused Exam


Vital Signs: 


 Vital Signs











  Temp Pulse Resp BP Pulse Ox


 


 12/30/19 23:06  99.0 F  126 H  22 H  143/93 H  98


 


 12/30/19 22:17  99.0 F  127 H  24 H  150/118 H  99











Date Exam was Performed: 12/30/19


Time Exam was Performed: 23:35





- My Orders


Last 24 Hours: 


My Active Orders





12/30/19 22:15


Blood Glucose Check, Bedside [RC] STAT 


Cardiac Monitoring [RC] CONTINUOUS 


Communication Order [RC] STAT 


Communication Order [RC] STAT 


Oxygen Therapy, ED [RC] PRN 


Peripheral IV Care [RC] .AS DIRECTED 


Chest 1V Frontal [CR] Stat 


GLYCOSYLATED HEMOGLOBIN,HGBA1C [CHEM] Stat 


KETONES,URINE [URIN] Stat 


Sodium Chloride 0.9% [Saline Flush]   10 ml FLUSH ASDIRECTED PRN 


Blood Culture x2 Reflex Set [OM.PC] Urgent 


Peripheral IV Insertion Adult [OM.PC] Stat 


Resuscitation Status Stat 





12/30/19 22:20


CULTURE BLOOD [BC] Urgent 





12/30/19 22:25


Peripheral IV Care [RC] .AS DIRECTED 


Sodium Chloride 0.9% [Saline Flush]   10 ml FLUSH ASDIRECTED PRN 


Peripheral IV Insertion Adult [OM.PC] Urgent 





12/30/19 22:30


Sodium Chloride 0.9% [Normal Saline] 1,000 ml IV ASDIRECTED 





12/30/19 22:35


CULTURE BLOOD [BC] Urgent 





12/30/19 23:30


Insulin Regular, Human [HumuLIN R] 100 unit   Sodium Chloride 0.9% [Normal 

Saline] 99 ml IV TITRATE 


Sodium Chloride 0.9% [Normal Saline] 1,000 ml IV ASDIRECTED 





12/31/19 00:30


GLUCOSE POC LAB TO COLLECT [POC] Stat 














- Assessment/Plan


Last 24 Hours: 


My Active Orders





12/30/19 22:15


Blood Glucose Check, Bedside [RC] STAT 


Cardiac Monitoring [RC] CONTINUOUS 


Communication Order [RC] STAT 


Communication Order [RC] STAT 


Oxygen Therapy, ED [RC] PRN 


Peripheral IV Care [RC] .AS DIRECTED 


Chest 1V Frontal [CR] Stat 


GLYCOSYLATED HEMOGLOBIN,HGBA1C [CHEM] Stat 


KETONES,URINE [URIN] Stat 


Sodium Chloride 0.9% [Saline Flush]   10 ml FLUSH ASDIRECTED PRN 


Blood Culture x2 Reflex Set [OM.PC] Urgent 


Peripheral IV Insertion Adult [OM.PC] Stat 


Resuscitation Status Stat 





12/30/19 22:20


CULTURE BLOOD [BC] Urgent 





12/30/19 22:25


Peripheral IV Care [RC] .AS DIRECTED 


Sodium Chloride 0.9% [Saline Flush]   10 ml FLUSH ASDIRECTED PRN 


Peripheral IV Insertion Adult [OM.PC] Urgent 





12/30/19 22:30


Sodium Chloride 0.9% [Normal Saline] 1,000 ml IV ASDIRECTED 





12/30/19 22:35


CULTURE BLOOD [BC] Urgent 





12/30/19 23:30


Insulin Regular, Human [HumuLIN R] 100 unit   Sodium Chloride 0.9% [Normal 

Saline] 99 ml IV TITRATE 


Sodium Chloride 0.9% [Normal Saline] 1,000 ml IV ASDIRECTED 





12/31/19 00:30


GLUCOSE POC LAB TO COLLECT [POC] Stat 











Plan: 





Assessment





Acuity = acute





Site and laterality = moderate to severe diabetic ketoacidosis complicating the 

patient with known history of diabetes mellitus type 1 as well as medical 

compliance





Etiology  = has been out of long-acting insulin for the last 3 to 4 days





Manifestations = lethargic





Location of injury =  Home





Lab values = WBC elevated 15.0 consistent leukocytosis pH 7.15 with PCO2 12.1 

and a bicarb of 4.0 consistent with metabolic acidosis, sodium low at 130 

consistent hyponatremia, glucose elevated 363 consistent hyperglycemia, 

moderate ketones in the serum, chest x-ray shows no acute process official read 

radiologist pending, influenza a and B both negative





Plan


Call discussed case with Dr. Flowers emergency room physician at [facility] at 222, he kindly excepted patient in transport will be transported via 

EMS ground, thus far she has been given 2 L of fluids 4 mg Zofran 5 mg 

Compazine started on insulin drip at 8 units/h





Critical care time 30 minutes

















 This note was dictated using dragon voice recognition software please call 

with any questions on syntax or grammar.

## 2020-04-23 NOTE — PCM.HP.2
H&P History of Present Illness





- General


Date of Service: 04/23/20


Admit Problem/Dx: 


 Admission Diagnosis/Problem





Admission Diagnosis/Problem      Diabetic ketoacidosis associated with type 1


                                 diabetes mellitus








Source of Information: Patient, Provider


History Limitations: Reports: No Limitations





- History of Present Illness


Initial Comments - Free Text/Narative: 





CC: I am hurting all over 





HPI: Samina presents to the emergency room with about 48 hours of progressive 

nausea with vomiting and diffuse myalgias as well as moderate generalized 

abdominal pain.  She reports that she ran out of her insulin about 1 week ago.  

She says that she tried to make several calls to set up appointments to get 

more insulin but was unsuccessful.  2 days ago she was feeling ill so she 

decided to use methamphetamine to try to help with her pain.  This did help her 

pain for a while but unfortunately yesterday morning through this morning her 

pain has worsened significantly.  Her abdominal pain currently is generalized 

and feels sharp as well as a crampy component.  The pain does not radiate 

outside of the abdomen.  She did try the methamphetamine to help with the pain.

  Nothing obviously makes the pain worse.  She has not had anything to eat in 

the past 24 hours but has been able to get some fluids down.  She has not been 

checking blood sugars.  No fevers or chills.  She has been constipated for the 

past week.  No dysuria.  No skin rashes.





Work-up in the emergency room revealed evidence for severe diabetic 

ketoacidosis with a pH of around 7 and a PCO2 of 7.  Kidney function and 

electrolytes are acceptable.  Blood sugar was only 320.  Urine drug screen was 

positive for methamphetamines.  She has been started on an insulin drip.  She 

will be admitted for further management of diabetic ketoacidosis.


  ** Generalized


Pain Score (Numeric/FACES): 6





- Related Data


Allergies/Adverse Reactions: 


 Allergies











Allergy/AdvReac Type Severity Reaction Status Date / Time


 


ibuprofen Allergy Unknown Swelling Verified 04/23/20 05:13











Home Medications: 


 Home Meds





Insulin Aspart [Novolog Flexpen] 15 unit SQ TIDAC 01/03/15 [History]


Insulin Detemir [Levemir] 50 unit SQ BID 01/03/15 [History]











Past Medical History


Gastrointestinal History: Reports: Cholelithiasis, Other (See Below)


Other Gastrointestinal History: 90# weight loss in past year


Musculoskeletal History: Reports: Other (See Below)


Other Musculoskeletal History: chronic left knee pain


Psychiatric History: Reports: Addiction


Endocrine/Metabolic History: Reports: Diabetes, Type I, IDDM


Hematologic History: Reports: Anemia


Dermatologic History: Reports: Other (See Below)


Other Dermatologic History: boils on buttock and abdomin





- Infectious Disease History


Infectious Disease History: Reports: Chicken Pox





- Past Surgical History


GI Surgical History: Reports: Cholecystectomy





Social & Family History





- Family History


Family Medical History: Unobtainable





- Tobacco Use


Smoking Status *Q: Current Some Day Smoker


Years of Tobacco use: 6


Packs/Tins Daily: 0.2


Second Hand Smoke Exposure: Yes





- Caffeine Use


Caffeine Use: Reports: Coffee





- Recreational Drug Use


Recreational Drug Use: Yes


Recreational Drug Type: Reports: Marijuana/Hashish


Recreational Drug Use Frequency: Binges





- Living Situation & Occupation


Living situation: Reports: Single





H&P Review of Systems





- Review of Systems:


Review Of Systems: See Below


Free Text/Narrative: 





A complete 12 point review of systems was obtained.  Pertinent positives and 

negatives are noted in the history of present illness.  All other systems were 

reviewed and were negative except as noted.





Exam





- Exam


Exam: See Below





- Vital Signs


Vital Signs: 


 Last Vital Signs











Temp  36.6 C   04/23/20 05:24


 


Pulse  135 H  04/23/20 06:42


 


Resp  26 H  04/23/20 06:42


 


BP  157/97 H  04/23/20 06:42


 


Pulse Ox  100   04/23/20 06:42











Weight: 83.915 kg





- Exam


Quality Assessment: No: Supplemental Oxygen


General: Alert, Oriented, Cooperative, Moderate Distress


HEENT: Conjunctiva Clear, Scleral Icterus.  No: Mucosa Moist & Pink (dry)


Neck: Supple, Trachea Midline.  No: Lymphadenopathy


Lungs: Clear to Auscultation.  No: Normal Respiratory Effort (Increased work of 

breathing)


Cardiovascular: Regular Rhythm, Tachycardia.  No: Systolic Murmur


GI/Abdominal Exam: Normal Bowel Sounds, Soft, Non-Tender, No Distention, No Mass


Extremities: No Pedal Edema.  No: Increased Warmth


Peripheral Pulses: 2+: Dorsalis Pedis (L), Dorsalis Pedis (R)


Skin: Warm, Dry


Neuro Extensive - Mental Status: Alert, Oriented x3, Nl Response to Commands


Neuro Extensive - Motor, Sensory, Reflexes: No: Dysarthria, Abnormal Motor, 

Tremor


Psychiatric: Alert, Normal Affect





- Patient Data


Lab Results Last 24 hrs: 


 Laboratory Results - last 24 hr











  04/23/20 04/23/20 04/23/20 Range/Units





  05:14 05:14 05:21 


 


WBC  16.1 H    (4.5-11.0)  K/uL


 


RBC  5.93 H    (3.30-5.50)  M/uL


 


Hgb  16.1 H    (12.0-15.0)  g/dL


 


Hct  50.0 H    (36.0-48.0)  %


 


MCV  84    (80-98)  fL


 


MCH  27    (27-31)  pg


 


MCHC  32    (32-36)  %


 


Plt Count  222    (150-400)  K/uL


 


Neut % (Auto)  81 H    (36-66)  %


 


Lymph % (Auto)  15 L    (24-44)  %


 


Mono % (Auto)  4    (2-6)  %


 


Eos % (Auto)  0 L    (2-4)  %


 


Baso % (Auto)  0    (0-1)  %


 


Puncture Site   Rt brachial   


 


ABG pH   7.078 L*   (7.350-7.450)  


 


ABG pCO2   7.0 L*   (35.0-42.0)  mmHg


 


ABG pO2   131.0 H   (75.0-100.0)  mmHg


 


ABG HCO3   2.0 L   (22.0-26.0)  mmol/L


 


ABG Total CO2   1.9 L   (21.0-25.0)  mmol/L


 


ABG O2 Saturation   97.1   (95.0-98.0)  %


 


ABG O2 Content   19.9   (15.0-23.0)  %vol


 


ABG Base Excess   -29.1   mm/L


 


ABG Hemoglobin   14.8   (12.0-16.0)  g/dL


 


ABG Oxyhemoglobin   94.9   %


 


ABG Carboxyhemoglobin   0.9   (0.0-1.6)  %


 


ABG Methemoglobin   1.4   %


 


Abner Test   Passed   


 


O2 Delivery Device   Room air   


 


Sodium    138 L  (140-148)  mmol/L


 


Potassium    4.1  (3.6-5.2)  mmol/L


 


Chloride    102  (100-108)  mmol/L


 


Carbon Dioxide    < 5 L  (21-32)  mmol/L


 


Anion Gap    35.1 H  (5.0-14.0)  mmol/L


 


BUN    9  (7-18)  mg/dL


 


Creatinine    1.0  (0.6-1.0)  mg/dL


 


Est Cr Clr Drug Dosing    88.95  mL/min


 


Estimated GFR (MDRD)    > 60  (>60)  


 


Glucose    319 H  ()  mg/dL


 


Lactic Acid     (0.4-2.0)  mmol/L


 


Calcium    8.7  (8.5-10.1)  mg/dL


 


Phosphorus    3.2  (2.5-4.9)  mg/dL


 


Magnesium    2.0  (1.8-2.4)  mg/dL


 


Total Bilirubin    0.5  (0.2-1.0)  mg/dL


 


AST    14 L  (15-37)  U/L


 


ALT    19  (12-78)  U/L


 


Alkaline Phosphatase    157 H  ()  U/L


 


Total Protein    8.5 H  (6.4-8.2)  g/dL


 


Albumin    4.2  (3.4-5.0)  g/dL


 


Globulin    4.3 H  (2.3-3.5)  g/dL


 


Albumin/Globulin Ratio    1.0 L  (1.2-2.2)  


 


Urine Color     (YELLOW)  


 


Urine Appearance     (CLEAR)  


 


Urine pH     (5.0-8.0)  


 


Ur Specific Gravity     (1.008-1.030)  


 


Urine Protein     (NEGATIVE)  mg/dL


 


Urine Glucose (UA)     (NEGATIVE)  mg/dL


 


Urine Ketones     (NEGATIVE)  mg/dL


 


Urine Occult Blood     (NEGATIVE)  


 


Urine Nitrite     (NEGATIVE)  


 


Urine Bilirubin     (NEGATIVE)  


 


Urine Urobilinogen     (0.2-1.0)  EU/dL


 


Ur Leukocyte Esterase     (NEGATIVE)  


 


Urine RBC     (0-5)  


 


Urine WBC     (0-5)  


 


Ur Epithelial Cells     


 


Amorphous Sediment     


 


Urine Bacteria     


 


Urine Mucus     


 


Urine Opiates Screen     (NEGATIVE)  


 


Ur Oxycodone Screen     (NEGATIVE)  


 


Urine Methadone Screen     (NEGATIVE)  


 


Ur Propoxyphene Screen     (NEGATIVE)  


 


Ur Barbiturates Screen     (NEGATIVE)  


 


Ur Tricyclics Screen     (NEGATIVE)  


 


Ur Phencyclidine Scrn     (NEGATIVE)  


 


Ur Amphetamine Screen     (NEGATIVE)  


 


U Methamphetamines Scrn     (NEGATIVE)  


 


Urine MDMA Screen     (NEGATIVE)  


 


U Benzodiazepines Scrn     (NEGATIVE)  


 


U Cocaine Metab Screen     (NEGATIVE)  


 


U Marijuana (THC) Screen     (NEGATIVE)  


 


Ketones     (NEGATIVE)  














  04/23/20 04/23/20 04/23/20 Range/Units





  05:21 05:21 05:31 


 


WBC     (4.5-11.0)  K/uL


 


RBC     (3.30-5.50)  M/uL


 


Hgb     (12.0-15.0)  g/dL


 


Hct     (36.0-48.0)  %


 


MCV     (80-98)  fL


 


MCH     (27-31)  pg


 


MCHC     (32-36)  %


 


Plt Count     (150-400)  K/uL


 


Neut % (Auto)     (36-66)  %


 


Lymph % (Auto)     (24-44)  %


 


Mono % (Auto)     (2-6)  %


 


Eos % (Auto)     (2-4)  %


 


Baso % (Auto)     (0-1)  %


 


Puncture Site     


 


ABG pH     (7.350-7.450)  


 


ABG pCO2     (35.0-42.0)  mmHg


 


ABG pO2     (75.0-100.0)  mmHg


 


ABG HCO3     (22.0-26.0)  mmol/L


 


ABG Total CO2     (21.0-25.0)  mmol/L


 


ABG O2 Saturation     (95.0-98.0)  %


 


ABG O2 Content     (15.0-23.0)  %vol


 


ABG Base Excess     mm/L


 


ABG Hemoglobin     (12.0-16.0)  g/dL


 


ABG Oxyhemoglobin     %


 


ABG Carboxyhemoglobin     (0.0-1.6)  %


 


ABG Methemoglobin     %


 


Abner Test     


 


O2 Delivery Device     


 


Sodium     (140-148)  mmol/L


 


Potassium     (3.6-5.2)  mmol/L


 


Chloride     (100-108)  mmol/L


 


Carbon Dioxide     (21-32)  mmol/L


 


Anion Gap     (5.0-14.0)  mmol/L


 


BUN     (7-18)  mg/dL


 


Creatinine     (0.6-1.0)  mg/dL


 


Est Cr Clr Drug Dosing     mL/min


 


Estimated GFR (MDRD)     (>60)  


 


Glucose     ()  mg/dL


 


Lactic Acid  1.9    (0.4-2.0)  mmol/L


 


Calcium     (8.5-10.1)  mg/dL


 


Phosphorus     (2.5-4.9)  mg/dL


 


Magnesium     (1.8-2.4)  mg/dL


 


Total Bilirubin     (0.2-1.0)  mg/dL


 


AST     (15-37)  U/L


 


ALT     (12-78)  U/L


 


Alkaline Phosphatase     ()  U/L


 


Total Protein     (6.4-8.2)  g/dL


 


Albumin     (3.4-5.0)  g/dL


 


Globulin     (2.3-3.5)  g/dL


 


Albumin/Globulin Ratio     (1.2-2.2)  


 


Urine Color     (YELLOW)  


 


Urine Appearance     (CLEAR)  


 


Urine pH     (5.0-8.0)  


 


Ur Specific Gravity     (1.008-1.030)  


 


Urine Protein     (NEGATIVE)  mg/dL


 


Urine Glucose (UA)     (NEGATIVE)  mg/dL


 


Urine Ketones     (NEGATIVE)  mg/dL


 


Urine Occult Blood     (NEGATIVE)  


 


Urine Nitrite     (NEGATIVE)  


 


Urine Bilirubin     (NEGATIVE)  


 


Urine Urobilinogen     (0.2-1.0)  EU/dL


 


Ur Leukocyte Esterase     (NEGATIVE)  


 


Urine RBC     (0-5)  


 


Urine WBC     (0-5)  


 


Ur Epithelial Cells     


 


Amorphous Sediment     


 


Urine Bacteria     


 


Urine Mucus     


 


Urine Opiates Screen    Negative  (NEGATIVE)  


 


Ur Oxycodone Screen    Negative  (NEGATIVE)  


 


Urine Methadone Screen    Negative  (NEGATIVE)  


 


Ur Propoxyphene Screen    Negative  (NEGATIVE)  


 


Ur Barbiturates Screen    Negative  (NEGATIVE)  


 


Ur Tricyclics Screen    Negative  (NEGATIVE)  


 


Ur Phencyclidine Scrn    Negative  (NEGATIVE)  


 


Ur Amphetamine Screen    Negative  (NEGATIVE)  


 


U Methamphetamines Scrn    Presumptive positive H  (NEGATIVE)  


 


Urine MDMA Screen    Negative  (NEGATIVE)  


 


U Benzodiazepines Scrn    Negative  (NEGATIVE)  


 


U Cocaine Metab Screen    Negative  (NEGATIVE)  


 


U Marijuana (THC) Screen    Negative  (NEGATIVE)  


 


Ketones   Moderate H   (NEGATIVE)  














  04/23/20 04/23/20 Range/Units





  06:00 06:31 


 


WBC    (4.5-11.0)  K/uL


 


RBC    (3.30-5.50)  M/uL


 


Hgb    (12.0-15.0)  g/dL


 


Hct    (36.0-48.0)  %


 


MCV    (80-98)  fL


 


MCH    (27-31)  pg


 


MCHC    (32-36)  %


 


Plt Count    (150-400)  K/uL


 


Neut % (Auto)    (36-66)  %


 


Lymph % (Auto)    (24-44)  %


 


Mono % (Auto)    (2-6)  %


 


Eos % (Auto)    (2-4)  %


 


Baso % (Auto)    (0-1)  %


 


Puncture Site    


 


ABG pH    (7.350-7.450)  


 


ABG pCO2    (35.0-42.0)  mmHg


 


ABG pO2    (75.0-100.0)  mmHg


 


ABG HCO3    (22.0-26.0)  mmol/L


 


ABG Total CO2    (21.0-25.0)  mmol/L


 


ABG O2 Saturation    (95.0-98.0)  %


 


ABG O2 Content    (15.0-23.0)  %vol


 


ABG Base Excess    mm/L


 


ABG Hemoglobin    (12.0-16.0)  g/dL


 


ABG Oxyhemoglobin    %


 


ABG Carboxyhemoglobin    (0.0-1.6)  %


 


ABG Methemoglobin    %


 


Abner Test    


 


O2 Delivery Device    


 


Sodium    (140-148)  mmol/L


 


Potassium  4.4   (3.6-5.2)  mmol/L


 


Chloride    (100-108)  mmol/L


 


Carbon Dioxide    (21-32)  mmol/L


 


Anion Gap    (5.0-14.0)  mmol/L


 


BUN    (7-18)  mg/dL


 


Creatinine    (0.6-1.0)  mg/dL


 


Est Cr Clr Drug Dosing    mL/min


 


Estimated GFR (MDRD)    (>60)  


 


Glucose  304 H   ()  mg/dL


 


Lactic Acid    (0.4-2.0)  mmol/L


 


Calcium    (8.5-10.1)  mg/dL


 


Phosphorus    (2.5-4.9)  mg/dL


 


Magnesium    (1.8-2.4)  mg/dL


 


Total Bilirubin    (0.2-1.0)  mg/dL


 


AST    (15-37)  U/L


 


ALT    (12-78)  U/L


 


Alkaline Phosphatase    ()  U/L


 


Total Protein    (6.4-8.2)  g/dL


 


Albumin    (3.4-5.0)  g/dL


 


Globulin    (2.3-3.5)  g/dL


 


Albumin/Globulin Ratio    (1.2-2.2)  


 


Urine Color   Yellow  (YELLOW)  


 


Urine Appearance   Clear  (CLEAR)  


 


Urine pH   5.5  (5.0-8.0)  


 


Ur Specific Gravity   >= 1.030  (1.008-1.030)  


 


Urine Protein   100 H  (NEGATIVE)  mg/dL


 


Urine Glucose (UA)   100 H  (NEGATIVE)  mg/dL


 


Urine Ketones   >=160 H  (NEGATIVE)  mg/dL


 


Urine Occult Blood   Small H  (NEGATIVE)  


 


Urine Nitrite   Negative  (NEGATIVE)  


 


Urine Bilirubin   Moderate H  (NEGATIVE)  


 


Urine Urobilinogen   0.2  (0.2-1.0)  EU/dL


 


Ur Leukocyte Esterase   Negative  (NEGATIVE)  


 


Urine RBC   0-5  (0-5)  


 


Urine WBC   0-5  (0-5)  


 


Ur Epithelial Cells   Few  


 


Amorphous Sediment   Many  


 


Urine Bacteria   Not seen  


 


Urine Mucus   Not seen  


 


Urine Opiates Screen    (NEGATIVE)  


 


Ur Oxycodone Screen    (NEGATIVE)  


 


Urine Methadone Screen    (NEGATIVE)  


 


Ur Propoxyphene Screen    (NEGATIVE)  


 


Ur Barbiturates Screen    (NEGATIVE)  


 


Ur Tricyclics Screen    (NEGATIVE)  


 


Ur Phencyclidine Scrn    (NEGATIVE)  


 


Ur Amphetamine Screen    (NEGATIVE)  


 


U Methamphetamines Scrn    (NEGATIVE)  


 


Urine MDMA Screen    (NEGATIVE)  


 


U Benzodiazepines Scrn    (NEGATIVE)  


 


U Cocaine Metab Screen    (NEGATIVE)  


 


U Marijuana (THC) Screen    (NEGATIVE)  


 


Ketones    (NEGATIVE)  











Result Diagrams: 


 04/23/20 05:14





 04/23/20 06:00





Sepsis Event Note





- Evaluation


Sepsis Screening Result: No Definite Risk





- Focused Exam


Vital Signs: 


 Vital Signs











  Temp Pulse Resp BP Pulse Ox


 


 04/23/20 06:42   135 H  26 H  157/97 H  100


 


 04/23/20 05:45   129 H  27 H  149/96 H  99


 


 04/23/20 05:24  36.6 C  129 H  26 H  149/96 H  99


 


 04/23/20 05:06  36.6 C  109 H  16  140/87  99











Date Exam was Performed: 04/23/20


Time Exam was Performed: 07:41





*Q Meaningful Use (ADM)





- VTE Risk Assess *Q


Each Risk Factor Represents 1 Point: None


Total Score 1 Point Risk Factors: 0


Each Risk Factor Represents 2 Points: None


Total Score 2 Point Risk Factors: 0


Each Risk Factor Represents 3 Points: None


Total Score 3 Point Risk Factors: 0


Each Risk Factor Represents 5 Points: None


Total Score 5 Point Risk Factors: 0


Venous Thromboembolism Risk Factor Score *Q: 0





- Problem List


(1) Diabetic ketoacidosis


SNOMED Code(s): 394721121, 069279769


   ICD Code: E13.10 - OTH DIABETES MELLITUS WITH KETOACIDOSIS WITHOUT COMA   

Status: Acute   Current Visit: Yes   


Qualifiers: 


   Diabetes mellitus type: type 1   Diabetes mellitus complication detail: 

without coma   Qualified Code(s): E10.10 - Type 1 diabetes mellitus with 

ketoacidosis without coma   





(2) High anion gap metabolic acidosis


SNOMED Code(s): 48407146


   ICD Code: E87.2 - ACIDOSIS   Status: Acute   Current Visit: Yes   





(3) Methamphetamine abuse


SNOMED Code(s): 453274740


   ICD Code: F15.10 - OTHER STIMULANT ABUSE, UNCOMPLICATED   Status: Chronic   

Current Visit: No   


Problem List Initiated/Reviewed/Updated: Yes


Orders Last 24hrs: 


 Active Orders 24 hr











 Category Date Time Status


 


 Patient Status Manage Transfer [TRANSFER] Routine ADT  04/23/20 07:26 Active


 


 Blood Glucose Check, Bedside [RC] STAT Care  04/23/20 05:14 Inactive


 


 Cardiac Monitoring [RC] CONTINUOUS Care  04/23/20 05:14 Active


 


 Communication Order [RC] STAT Care  04/23/20 05:14 Active


 


 Communication Order [RC] STAT Care  04/23/20 05:14 Active


 


 Communication Order [RC] STAT Care  04/23/20 05:55 Active


 


 Peripheral IV Care [RC] .AS DIRECTED Care  04/23/20 05:14 Active


 


 BASIC METABOLIC PANEL,BMP [CHEM] Q4H Lab  04/23/20 10:00 Ordered


 


 BASIC METABOLIC PANEL,BMP [CHEM] Q4H Lab  04/23/20 14:00 Ordered


 


 BASIC METABOLIC PANEL,BMP [CHEM] Q4H Lab  04/23/20 18:00 Ordered


 


 BASIC METABOLIC PANEL,BMP [CHEM] Q4H Lab  04/23/20 22:00 Ordered


 


 CULTURE BLOOD [BC] Urgent Lab  04/23/20 05:30 Received


 


 CULTURE BLOOD [BC] Urgent Lab  04/23/20 05:39 Received


 


 GLUCOSE RANDOM [CHEM] Q1H Lab  04/23/20 07:00 Ordered


 


 GLUCOSE RANDOM [CHEM] Q1H Lab  04/23/20 08:00 Ordered


 


 GLUCOSE RANDOM [CHEM] Q1H Lab  04/23/20 09:00 Ordered


 


 POTASSIUM,K [CHEM] Q1H Lab  04/23/20 07:00 Ordered


 


 POTASSIUM,K [CHEM] Q1H Lab  04/23/20 08:00 Ordered


 


 POTASSIUM,K [CHEM] Q1H Lab  04/23/20 09:00 Ordered


 


 Insulin Regular in 0.9 % NACL [Myxredlin 100 UNIT/100 Med  04/23/20 05:30 

Active





 ML] 100 ml   





 IV ASDIRECTED   


 


 Sodium Chloride 0.9% [Normal Saline] 1,000 ml Med  04/23/20 05:30 Active





 IV ASDIRECTED   


 


 Sodium Chloride 0.9% [Normal Saline] 1,000 ml Med  04/23/20 07:00 Active





 IV ASDIRECTED   


 


 Sodium Chloride 0.9% [Saline Flush] Med  04/23/20 05:14 Active





 10 ml FLUSH ASDIRECTED PRN   


 


 Blood Culture x2 Reflex Set [OM.PC] Urgent Oth  04/23/20 05:14 Ordered


 


 Peripheral IV Insertion Adult [OM.PC] Stat Oth  04/23/20 05:14 Ordered


 


 Resuscitation Status Stat Resus Stat  04/23/20 05:14 Ordered








 Medication Orders





Insulin Regular in 0.9 % NACL (Myxredlin 100 Unit/100 Ml)  100 mls @ 11.748 mls/

hr IV ASDIRECTED JUAN; Protocol


   Last Admin: 04/23/20 05:49  Dose: 0.14 units/kg/hr, 11.748 mls/hr


Sodium Chloride (Normal Saline)  1,000 mls @ 999 mls/hr IV ASDIRECTED JUAN


   Last Admin: 04/23/20 05:37  Dose: 999 mls/hr


Sodium Chloride (Normal Saline)  1,000 mls @ 999 mls/hr IV ASDIRECTED JUAN


Sodium Chloride (Saline Flush)  10 ml FLUSH ASDIRECTED PRN


   PRN Reason: Keep Vein Open


   Last Admin: 04/23/20 05:44  Dose: 10 ml








Assessment/Plan Comment:: 





ASSESSMENT AND PLAN - 





Diabetic ketoacidosis-pH of 7 and bicarb of only 7.  Secondary to lack of 

insulin and complicated by methamphetamine abuse.  She is very tachycardic and 

volume depleted at this time.  Blood sugar is not very high at around 300.


-Insulin drip


-IV fluids, anticipate transition to dextrose containing fluids in the next 1 

to 2 hours


-Every hour Accu-Cheks


-BMP every 4 hours


-Hemoglobin A1c in the morning


-Diabetic education


-Transition to long-acting insulin once anion gap normalizes





Anion gap metabolic acidosis-secondary to DKA.  No evidence for kidney failure 

at this time.


-Management as above





Methamphetamine abuse-patient reports using the methamphetamine to help with 

her abdominal pain and myalgias.  She does not believe that she has a problem.  

She does not report regular use of methamphetamines but her very poor dentition 

would suggest otherwise.  There will need to be more discussions with her about 

her methamphetamine use when she is feeling better after diabetic ketoacidosis 

has resolved.


-Offer cessation resources





Maintenance issues - 


- DVT prophylaxis -mechanical


- GI prophylaxis -PPI


- Nutrition -clear liquids


- Franco catheter -not indicated





CODE STATUS -full code





Admission justification -this patient will be admitted for inpatient services 

and is medically appropriate meeting medical necessity for inpatient admission 

as outlined in my documentation.  I reasonably expect the patient will require 

inpatient services that span a period time over 2 midnights. I reasonably 

expect this patient to be discharged or transferred within 96 hours [facility] 

[facility]





Disposition -I would anticipate discharge to home after the hospital stay





Primary care physician -none





Erickson Garay M.D.





- Mortality Measure


Prognosis:: Good

## 2020-04-23 NOTE — EDM.PDOC
ED HPI GENERAL MEDICAL PROBLEM





- General


Chief Complaint: Diabetic Complaint


Stated Complaint: VOMITING/BODY ACHES


Time Seen by Provider: 04/23/20 05:13


Source of Information: Reports: Patient, RN Notes Reviewed


History Limitations: Reports: No Limitations





- History of Present Illness


INITIAL COMMENTS - FREE TEXT/NARRATIVE: 





29-year-old female presents emergency department with a complaint of nausea 

vomiting generalized abdominal pain, she has a known history of diabetes 

mellitus type 1 has been out of her medications for the last 5 or 6 days has 

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


  ** Generalized


Pain Score (Numeric/FACES): 6





- Related Data


 Allergies











Allergy/AdvReac Type Severity Reaction Status Date / Time


 


ibuprofen Allergy Unknown Swelling Verified 04/23/20 05:13











Home Meds: 


 Home Meds





Insulin Aspart [Novolog Flexpen] 10 unit SQ ASDIRECTED 01/03/15 [History]


Insulin Detemir [Levemir] 50 unit SQ BID 01/03/15 [History]











Past Medical History


Gastrointestinal History: Reports: Cholelithiasis, Other (See Below)


Other Gastrointestinal History: 90# weight loss in past year


Musculoskeletal History: Reports: Other (See Below)


Other Musculoskeletal History: chronic left knee pain


Psychiatric History: Reports: Addiction


Endocrine/Metabolic History: Reports: Diabetes, Type I, IDDM


Hematologic History: Reports: Anemia


Dermatologic History: Reports: Other (See Below)


Other Dermatologic History: boils on buttock and abdomin





- Infectious Disease History


Infectious Disease History: Reports: Chicken Pox





- Past Surgical History


GI Surgical History: Reports: Cholecystectomy





Social & Family History





- Family History


Family Medical History: Unobtainable





- Caffeine Use


Caffeine Use: Reports: Coffee





- Living Situation & Occupation


Living situation: Reports: Single





ED ROS GENERAL





- Review of Systems


Review Of Systems: See Below


Constitutional: Reports: Weight Loss


HEENT: Reports: No Symptoms


Respiratory: Reports: No Symptoms


Cardiovascular: Reports: No Symptoms


GI/Abdominal: Reports: Abdominal Pain, Nausea, Vomiting


: Reports: No Symptoms


Musculoskeletal: Reports: Muscle Pain


Skin: Reports: No Symptoms





ED EXAM GENERAL NO PERIP PULSE





- Physical Exam


Exam: See Below


Exam Limited By: No Limitations


General Appearance: Alert, WD/WN, No Apparent Distress


Throat/Mouth: No Airway Compromise, Other (Dentition is poor)


Respiratory/Chest: No Respiratory Distress, Lungs Clear, Normal Breath Sounds, 

No Accessory Muscle Use, Chest Non-Tender


Cardiovascular: No Murmur, Tachycardia


GI/Abdominal: Soft, Non-Tender


Extremities: No Pedal Edema





Course





- Vital Signs


Last Recorded V/S: 


 Last Vital Signs











Temp  97.8 F   04/23/20 05:24


 


Pulse  129 H  04/23/20 05:24


 


Resp  26 H  04/23/20 05:24


 


BP  149/96 H  04/23/20 05:24


 


Pulse Ox  99   04/23/20 05:24














- Orders/Labs/Meds


Orders: 


 Active Orders 24 hr











 Category Date Time Status


 


 Blood Glucose Check, Bedside [RC] STAT Care  04/23/20 05:14 Inactive


 


 Cardiac Monitoring [RC] CONTINUOUS Care  04/23/20 05:14 Active


 


 Communication Order [RC] STAT Care  04/23/20 05:14 Active


 


 Communication Order [RC] STAT Care  04/23/20 05:14 Active


 


 Communication Order [RC] STAT Care  04/23/20 05:55 Active


 


 Peripheral IV Care [RC] .AS DIRECTED Care  04/23/20 05:14 Active


 


 CULTURE BLOOD [BC] Urgent Lab  04/23/20 05:30 Received


 


 CULTURE BLOOD [BC] Urgent Lab  04/23/20 05:39 Received


 


 DRUG SCREEN, URINE [URCHEM] Stat Lab  04/23/20 05:31 Ordered


 


 GLUCOSE RANDOM [CHEM] Q1H Lab  04/23/20 06:00 Ordered


 


 GLUCOSE RANDOM [CHEM] [facility] Lab  04/23/20 07:00 Ordered


 


 GLUCOSE RANDOM [CHEM] [facility] Lab  04/23/20 08:00 Ordered


 


 GLUCOSE RANDOM [CHEM] Q1 Lab  04/23/20 09:00 Ordered


 


 GLUCOSE RANDOM [CHEM] Q1 Lab  04/23/20 10:00 Ordered


 


 POTASSIUM,K [CHEM] Q1 Lab  04/23/20 06:00 Ordered


 


 POTASSIUM,K [CHEM] Q1 Lab  04/23/20 07:00 Ordered


 


 POTASSIUM,K [CHEM] Q1 Lab  04/23/20 08:00 Ordered


 


 POTASSIUM,K [CHEM] Q1 Lab  04/23/20 09:00 Ordered


 


 POTASSIUM,K [CHEM] Q1 Lab  04/23/20 10:00 Ordered


 


 UA W/MICROSCOPIC [URIN] Stat Lab  04/23/20 05:14 Ordered


 


 Insulin Regular in 0.9 % NACL [Myxredlin 100 UNIT/100 Med  04/23/20 05:30 

Active





 ML] 100 ml   





 IV ASDIRECTED   


 


 Sodium Chloride 0.9% [Normal Saline] 1,000 ml Med  04/23/20 05:30 Active





 IV ASDIRECTED   


 


 Sodium Chloride 0.9% [Saline Flush] Med  04/23/20 05:14 Active





 10 ml FLUSH ASDIRECTED PRN   


 


 Blood Culture x2 Reflex Set [OM.PC] Urgent Oth  04/23/20 05:14 Ordered


 


 Peripheral IV Insertion Adult [OM.PC] Stat Oth  04/23/20 05:14 Ordered


 


 Resuscitation Status Stat Resus Stat  04/23/20 05:14 Ordered








 Medication Orders





Insulin Regular in 0.9 % NACL (Myxredlin 100 Unit/100 Ml)  100 mls @ 11.748 mls/

hr IV ASDIRECTED JUAN; Protocol


   Last Admin: 04/23/20 05:49  Dose: 0.14 units/kg/hr, 11.748 mls/hr


Sodium Chloride (Normal Saline)  1,000 mls @ 999 mls/hr IV ASDIRECTED JUAN


   Last Admin: 04/23/20 05:37  Dose: 999 mls/hr


Sodium Chloride (Saline Flush)  10 ml FLUSH ASDIRECTED PRN


   PRN Reason: Keep Vein Open


   Last Admin: 04/23/20 05:44  Dose: 10 ml








Labs: 


 Laboratory Tests











  04/23/20 04/23/20 04/23/20 Range/Units





  05:14 05:14 05:21 


 


WBC  16.1 H    (4.5-11.0)  K/uL


 


RBC  5.93 H    (3.30-5.50)  M/uL


 


Hgb  16.1 H    (12.0-15.0)  g/dL


 


Hct  50.0 H    (36.0-48.0)  %


 


MCV  84    (80-98)  fL


 


MCH  27    (27-31)  pg


 


MCHC  32    (32-36)  %


 


Plt Count  222    (150-400)  K/uL


 


Neut % (Auto)  81 H    (36-66)  %


 


Lymph % (Auto)  15 L    (24-44)  %


 


Mono % (Auto)  4    (2-6)  %


 


Eos % (Auto)  0 L    (2-4)  %


 


Baso % (Auto)  0    (0-1)  %


 


Puncture Site   Rt brachial   


 


ABG pH   7.078 L*   (7.350-7.450)  


 


ABG pCO2   7.0 L*   (35.0-42.0)  mmHg


 


ABG pO2   131.0 H   (75.0-100.0)  mmHg


 


ABG HCO3   2.0 L   (22.0-26.0)  mmol/L


 


ABG Total CO2   1.9 L   (21.0-25.0)  mmol/L


 


ABG O2 Saturation   97.1   (95.0-98.0)  %


 


ABG O2 Content   19.9   (15.0-23.0)  %vol


 


ABG Base Excess   -29.1   mm/L


 


ABG Hemoglobin   14.8   (12.0-16.0)  g/dL


 


ABG Oxyhemoglobin   94.9   %


 


ABG Carboxyhemoglobin   0.9   (0.0-1.6)  %


 


ABG Methemoglobin   1.4   %


 


Abner Test   Passed   


 


O2 Delivery Device   Room air   


 


Sodium    138 L  (140-148)  mmol/L


 


Potassium    4.1  (3.6-5.2)  mmol/L


 


Chloride    102  (100-108)  mmol/L


 


Carbon Dioxide    < 5 L  (21-32)  mmol/L


 


Anion Gap    35.1 H  (5.0-14.0)  mmol/L


 


BUN    9  (7-18)  mg/dL


 


Creatinine    1.0  (0.6-1.0)  mg/dL


 


Est Cr Clr Drug Dosing    88.95  mL/min


 


Estimated GFR (MDRD)    > 60  (>60)  


 


Glucose    319 H  ()  mg/dL


 


Lactic Acid     (0.4-2.0)  mmol/L


 


Calcium    8.7  (8.5-10.1)  mg/dL


 


Phosphorus    3.2  (2.5-4.9)  mg/dL


 


Magnesium    2.0  (1.8-2.4)  mg/dL


 


Total Bilirubin    0.5  (0.2-1.0)  mg/dL


 


AST    14 L  (15-37)  U/L


 


ALT    19  (12-78)  U/L


 


Alkaline Phosphatase    157 H  ()  U/L


 


Total Protein    8.5 H  (6.4-8.2)  g/dL


 


Albumin    4.2  (3.4-5.0)  g/dL


 


Globulin    4.3 H  (2.3-3.5)  g/dL


 


Albumin/Globulin Ratio    1.0 L  (1.2-2.2)  


 


Ketones     (NEGATIVE)  














  04/23/20 04/23/20 Range/Units





  05:21 05:21 


 


WBC    (4.5-11.0)  K/uL


 


RBC    (3.30-5.50)  M/uL


 


Hgb    (12.0-15.0)  g/dL


 


Hct    (36.0-48.0)  %


 


MCV    (80-98)  fL


 


MCH    (27-31)  pg


 


MCHC    (32-36)  %


 


Plt Count    (150-400)  K/uL


 


Neut % (Auto)    (36-66)  %


 


Lymph % (Auto)    (24-44)  %


 


Mono % (Auto)    (2-6)  %


 


Eos % (Auto)    (2-4)  %


 


Baso % (Auto)    (0-1)  %


 


Puncture Site    


 


ABG pH    (7.350-7.450)  


 


ABG pCO2    (35.0-42.0)  mmHg


 


ABG pO2    (75.0-100.0)  mmHg


 


ABG HCO3    (22.0-26.0)  mmol/L


 


ABG Total CO2    (21.0-25.0)  mmol/L


 


ABG O2 Saturation    (95.0-98.0)  %


 


ABG O2 Content    (15.0-23.0)  %vol


 


ABG Base Excess    mm/L


 


ABG Hemoglobin    (12.0-16.0)  g/dL


 


ABG Oxyhemoglobin    %


 


ABG Carboxyhemoglobin    (0.0-1.6)  %


 


ABG Methemoglobin    %


 


Abner Test    


 


O2 Delivery Device    


 


Sodium    (140-148)  mmol/L


 


Potassium    (3.6-5.2)  mmol/L


 


Chloride    (100-108)  mmol/L


 


Carbon Dioxide    (21-32)  mmol/L


 


Anion Gap    (5.0-14.0)  mmol/L


 


BUN    (7-18)  mg/dL


 


Creatinine    (0.6-1.0)  mg/dL


 


Est Cr Clr Drug Dosing    mL/min


 


Estimated GFR (MDRD)    (>60)  


 


Glucose    ()  mg/dL


 


Lactic Acid  1.9   (0.4-2.0)  mmol/L


 


Calcium    (8.5-10.1)  mg/dL


 


Phosphorus    (2.5-4.9)  mg/dL


 


Magnesium    (1.8-2.4)  mg/dL


 


Total Bilirubin    (0.2-1.0)  mg/dL


 


AST    (15-37)  U/L


 


ALT    (12-78)  U/L


 


Alkaline Phosphatase    ()  U/L


 


Total Protein    (6.4-8.2)  g/dL


 


Albumin    (3.4-5.0)  g/dL


 


Globulin    (2.3-3.5)  g/dL


 


Albumin/Globulin Ratio    (1.2-2.2)  


 


Ketones   Moderate H  (NEGATIVE)  











Meds: 


Medications











Generic Name Dose Route Start Last Admin





  Trade Name Estevanq  PRN Reason Stop Dose Admin


 


Insulin Regular in 0.9 % NACL  100 mls @ 11.748 mls/hr  04/23/20 05:30  04/23/ 20 05:49





  Myxredlin 100 Unit/100 Ml  IV   0.14 units/kg/hr





  ASDIRECTED JUAN   11.748 mls/hr





     Administration





     





  Protocol   





  0.14 UNITS/KG/HR   


 


Sodium Chloride  1,000 mls @ 999 mls/hr  04/23/20 05:30  04/23/20 05:37





  Normal Saline  IV   999 mls/hr





  ASDIRECTED JUAN   Administration





     





     





     





     


 


Sodium Chloride  10 ml  04/23/20 05:14  04/23/20 05:44





  Saline Flush  FLUSH   10 ml





  ASDIRECTED PRN   Administration





  Keep Vein Open   





     





     





     














Discontinued Medications














Generic Name Dose Route Start Last Admin





  Trade Name Estevanq  PRN Reason Stop Dose Admin


 


Fentanyl  50 mcg  04/23/20 05:21  04/23/20 05:40





  Sublimaze  IVPUSH  04/23/20 05:22  50 mcg





  ONETIME ONE   Administration





     





     





     





     


 


Ondansetron HCl  4 mg  04/23/20 05:14  04/23/20 05:36





  Zofran  IVPUSH  04/23/20 05:15  4 mg





  ONETIME ONE   Administration





     





     





     





     














Departure





- Departure


Time of Disposition: 06:08


Disposition: Admitted As Inpatient 65


Condition: Poor


Clinical Impression: 


 High anion gap metabolic acidosis





Diabetic ketoacidosis


Qualifiers:


 Diabetes mellitus type: type 1 Diabetes mellitus complication detail: without 

coma Qualified Code(s): E10.10 - Type 1 diabetes mellitus with ketoacidosis 

without coma








- Discharge Information


Referrals: 


PCP,None [Primary Care Provider] - 


Forms:  ED Department Discharge





Critical Care Note





- Critical Care Note


Total Time (mins): 20





Sepsis Event Note





- Focused Exam


Vital Signs: 


 Vital Signs











  Temp Pulse Resp BP Pulse Ox


 


 04/23/20 05:24  97.8 F  129 H  26 H  149/96 H  99


 


 04/23/20 05:06  97.8 F  109 H  16  140/87  99











Date Exam was Performed: 04/23/20


Time Exam was Performed: 06:03





- My Orders


Last 24 Hours: 


My Active Orders





04/23/20 05:14


Blood Glucose Check, Bedside [RC] STAT 


Cardiac Monitoring [RC] CONTINUOUS 


Communication Order [RC] STAT 


Communication Order [RC] STAT 


Peripheral IV Care [RC] .AS DIRECTED 


UA W/MICROSCOPIC [URIN] Stat 


Sodium Chloride 0.9% [Saline Flush]   10 ml FLUSH ASDIRECTED PRN 


Blood Culture x2 Reflex Set [OM.PC] Urgent 


Peripheral IV Insertion Adult [OM.PC] Stat 


Resuscitation Status Stat 





04/23/20 05:30


CULTURE BLOOD [BC] Urgent 


Insulin Regular in 0.9 % NACL [Myxredlin 100 UNIT/100 ML] 100 ml IV ASDIRECTED 


Sodium Chloride 0.9% [Normal Saline] 1,000 ml IV ASDIRECTED 





04/23/20 05:31


DRUG SCREEN, URINE [URCHEM] Stat 





04/23/20 05:39


CULTURE BLOOD [BC] Urgent 





04/23/20 05:55


Communication Order [RC] STAT 





04/23/20 06:00


GLUCOSE RANDOM [CHEM] Q1H 


POTASSIUM,K [CHEM] Q1H 





04/23/20 07:00


GLUCOSE RANDOM [CHEM] Q1H 


POTASSIUM,K [CHEM] Q1H 





04/23/20 08:00


GLUCOSE RANDOM [CHEM] Q1H 


POTASSIUM,K [CHEM] Q1H 





04/23/20 09:00


GLUCOSE RANDOM [CHEM] Q1H 


POTASSIUM,K [CHEM] Q1H 





04/23/20 10:00


GLUCOSE RANDOM [CHEM] Q1H 


POTASSIUM,K [CHEM] Q1H 














- Assessment/Plan


Last 24 Hours: 


My Active Orders





04/23/20 05:14


Blood Glucose Check, Bedside [RC] STAT 


Cardiac Monitoring [RC] CONTINUOUS 


Communication Order [RC] STAT 


Communication Order [RC] STAT 


Peripheral IV Care [RC] .AS DIRECTED 


UA W/MICROSCOPIC [URIN] Stat 


Sodium Chloride 0.9% [Saline Flush]   10 ml FLUSH ASDIRECTED PRN 


Blood Culture x2 Reflex Set [OM.PC] Urgent 


Peripheral IV Insertion Adult [OM.PC] Stat 


Resuscitation Status Stat 





04/23/20 05:30


CULTURE BLOOD [BC] Urgent 


Insulin Regular in 0.9 % NACL [Myxredlin 100 UNIT/100 ML] 100 ml IV ASDIRECTED 


Sodium Chloride 0.9% [Normal Saline] 1,000 ml IV ASDIRECTED 





04/23/20 05:31


DRUG SCREEN, URINE [URCHEM] Stat 





04/23/20 05:39


CULTURE BLOOD [BC] Urgent 





04/23/20 05:55


Communication Order [RC] STAT 





04/23/20 06:00


GLUCOSE RANDOM [CHEM] Q1H 


POTASSIUM,K [CHEM] Q1H 





04/23/20 07:00


GLUCOSE RANDOM [CHEM] Q1H 


POTASSIUM,K [CHEM] Q1H 





04/23/20 08:00


GLUCOSE RANDOM [CHEM] Q1H 


POTASSIUM,K [CHEM] Q1H 





04/23/20 09:00


GLUCOSE RANDOM [CHEM] Q1H 


POTASSIUM,K [CHEM] Q1H 





04/23/20 10:00


GLUCOSE RANDOM [CHEM] Q1H 


POTASSIUM,K [CHEM] Q1H 











Plan: 





Assessment





Acuity = acute





Site and laterality = DKA moderate





Etiology  = secondary to medication compliance





Manifestations = none





Location of injury =  Home





Lab values = WBC elevated 16.1 consistent leukocytosis pH 7.8 PCO2 of 7 bicarb 

of 2 consistent with an anion gap metabolic acidosis glucose 319 moderate 

ketones in the urine





Plan


Call discussed case with hospitalist on-call at 6 AM he currently agreed to 

come to the emergency department and evaluate the patient for admission

















 This note was dictated using dragon voice recognition software please call 

with any questions on syntax or grammar.

## 2020-04-24 NOTE — PCM.PN
- General Info


Date of Service: 04/24/20


Subjective Update: 





There were no acute events overnight.  No significant nausea or abdominal pain 

this morning.  She did have some heartburn after eating.  Overnight her anion 

gap slowly decreased and finally normalized this morning.  Blood sugars have 

been stable around 200.  She feels tired but otherwise feels better.  Kidney 

function has been stable.  Tachycardia has improved.  Potassium remains mildly 

low but has been improving with supplementation.


Functional Status: Reports: Pain Controlled, Tolerating Diet





- Review of Systems


General: Denies: Fever





- Patient Data


Vitals - Most Recent: 


 Last Vital Signs











Temp  36.4 C   04/24/20 12:00


 


Pulse  101 H  04/24/20 12:00


 


Resp  20   04/24/20 12:00


 


BP  139/93 H  04/24/20 12:00


 


Pulse Ox  100   04/24/20 12:53











Weight - Most Recent: 92.986 kg


I&O - Last 24 Hours: 


 Intake & Output











 04/23/20 04/24/20 04/24/20





 22:59 06:59 14:59


 


Intake Total 2563 1700 1224


 


Output Total 1900  700


 


Balance 663 1700 524











Lab Results Last 24 Hours: 


 Laboratory Results - last 24 hr











  04/23/20 04/23/20 04/23/20 Range/Units





  14:00 18:08 22:00 


 


WBC     (4.5-11.0)  K/uL


 


RBC     (3.30-5.50)  M/uL


 


Hgb     (12.0-15.0)  g/dL


 


Hct     (36.0-48.0)  %


 


MCV     (80-98)  fL


 


MCH     (27-31)  pg


 


MCHC     (32-36)  %


 


Plt Count     (150-400)  K/uL


 


Sodium  138 L  144  141  (140-148)  mmol/L


 


Potassium  3.6  3.7  3.3 L  (3.6-5.2)  mmol/L


 


Chloride  110 H  113 H  111 H  (100-108)  mmol/L


 


Carbon Dioxide  7 L  10 L  14 L  (21-32)  mmol/L


 


Anion Gap  24.6 H  24.7 H  19.3 H  (5.0-14.0)  mmol/L


 


BUN  7  7  4 L  (7-18)  mg/dL


 


Creatinine  0.8  0.8  0.7  (0.6-1.0)  mg/dL


 


Est Cr Clr Drug Dosing  111.19  111.19  127.08  mL/min


 


Estimated GFR (MDRD)  > 60  > 60  > 60  (>60)  


 


Glucose  210 H  246 H  204 H  ()  mg/dL


 


Hemoglobin A1c     (4.5-6.2)  %


 


Calcium  7.6 L  7.6 L  7.6 L  (8.5-10.1)  mg/dL


 


Total Bilirubin     (0.2-1.0)  mg/dL


 


AST     (15-37)  U/L


 


ALT     (12-78)  U/L


 


Alkaline Phosphatase     ()  U/L


 


Total Protein     (6.4-8.2)  g/dL


 


Albumin     (3.4-5.0)  g/dL


 


Globulin     (2.3-3.5)  g/dL


 


Albumin/Globulin Ratio     (1.2-2.2)  














  04/24/20 04/24/20 04/24/20 Range/Units





  02:00 05:11 06:04 


 


WBC    7.3  (4.5-11.0)  K/uL


 


RBC    4.33  (3.30-5.50)  M/uL


 


Hgb    11.6 L D  (12.0-15.0)  g/dL


 


Hct    36.1  (36.0-48.0)  %


 


MCV    83  (80-98)  fL


 


MCH    27  (27-31)  pg


 


MCHC    32  (32-36)  %


 


Plt Count    229  (150-400)  K/uL


 


Sodium  138 L    (140-148)  mmol/L


 


Potassium  3.6    (3.6-5.2)  mmol/L


 


Chloride  108    (100-108)  mmol/L


 


Carbon Dioxide  17 L    (21-32)  mmol/L


 


Anion Gap  16.6 H    (5.0-14.0)  mmol/L


 


BUN  4 L    (7-18)  mg/dL


 


Creatinine  0.7    (0.6-1.0)  mg/dL


 


Est Cr Clr Drug Dosing  127.08    mL/min


 


Estimated GFR (MDRD)  > 60    (>60)  


 


Glucose  192 H    ()  mg/dL


 


Hemoglobin A1c   13.2 H   (4.5-6.2)  %


 


Calcium  7.5 L    (8.5-10.1)  mg/dL


 


Total Bilirubin     (0.2-1.0)  mg/dL


 


AST     (15-37)  U/L


 


ALT     (12-78)  U/L


 


Alkaline Phosphatase     ()  U/L


 


Total Protein     (6.4-8.2)  g/dL


 


Albumin     (3.4-5.0)  g/dL


 


Globulin     (2.3-3.5)  g/dL


 


Albumin/Globulin Ratio     (1.2-2.2)  














  04/24/20 Range/Units





  06:04 


 


WBC   (4.5-11.0)  K/uL


 


RBC   (3.30-5.50)  M/uL


 


Hgb   (12.0-15.0)  g/dL


 


Hct   (36.0-48.0)  %


 


MCV   (80-98)  fL


 


MCH   (27-31)  pg


 


MCHC   (32-36)  %


 


Plt Count   (150-400)  K/uL


 


Sodium  138 L  (140-148)  mmol/L


 


Potassium  3.3 L  (3.6-5.2)  mmol/L


 


Chloride  108  (100-108)  mmol/L


 


Carbon Dioxide  20 L  (21-32)  mmol/L


 


Anion Gap  13.3  (5.0-14.0)  mmol/L


 


BUN  3 L  (7-18)  mg/dL


 


Creatinine  0.6  (0.6-1.0)  mg/dL


 


Est Cr Clr Drug Dosing  148.26  mL/min


 


Estimated GFR (MDRD)  > 60  (>60)  


 


Glucose  185 H  ()  mg/dL


 


Hemoglobin A1c   (4.5-6.2)  %


 


Calcium  7.7 L  (8.5-10.1)  mg/dL


 


Total Bilirubin  0.4  (0.2-1.0)  mg/dL


 


AST  13 L  (15-37)  U/L


 


ALT  14  (12-78)  U/L


 


Alkaline Phosphatase  98  ()  U/L


 


Total Protein  5.5 L  (6.4-8.2)  g/dL


 


Albumin  2.6 L  (3.4-5.0)  g/dL


 


Globulin  2.9  (2.3-3.5)  g/dL


 


Albumin/Globulin Ratio  0.9 L  (1.2-2.2)  











Barney Results Last 24 Hours: 


 Microbiology











 04/23/20 05:39 Aerobic Blood Culture - Preliminary





 Blood - Venous - Lab Draw    NO GROWTH AFTER 1 DAY





 Anaerobic Blood Culture - Preliminary





    NO GROWTH AFTER 1 DAY


 


 04/23/20 05:30 Aerobic Blood Culture - Preliminary





 Blood - Venous    NO GROWTH AFTER 1 DAY





 Anaerobic Blood Culture - Preliminary





    NO GROWTH AFTER 1 DAY











Med Orders - Current: 


 Current Medications





Acetaminophen (Tylenol)  650 mg PO Q4H PRN


   PRN Reason: Pain (Mild 1-3)/fever


   Last Admin: 04/24/20 12:22 Dose:  650 mg


Potassium Chloride 20 meq/Lidocaine HCl 2 ml/ Sodium Chloride  112 mls @ 56 mls/

hr IV Q2H [facility]


   Stop: 04/24/20 13:59


   Last Admin: 04/24/20 12:38 Dose:  56 mls/hr


Insulin Glargine (Lantus Solostar)  50 units SUBCUT BID [facility]


   Last Admin: 04/24/20 09:59 Dose:  50 units


Insulin Human Lispro (Humalog)  0 unit SUBCUT QIDACANDBED [facility]; Protocol


   Last Admin: 04/24/20 11:39 Dose:  6 units


Lorazepam (Ativan)  0.5 mg IVPUSH Q4H PRN


   PRN Reason: Nausea/Vomiting


   Last Admin: 04/23/20 21:12 Dose:  0.5 mg


Magnesium Hydroxide (Milk Of Magnesia)  30 ml PO Q12H PRN


   PRN Reason: Constipation


Melatonin (Melatonin)  9 mg PO BEDTIME [facility]


   Last Admin: 04/23/20 21:21 Dose:  9 mg


Ondansetron HCl (Zofran Odt)  4 mg PO Q6H PRN


   PRN Reason: Nausea able to take PO


Ondansetron HCl (Zofran)  4 mg IV Q6H PRN


   PRN Reason: Nausea/Vomiting


   Last Admin: 04/23/20 17:33 Dose:  4 mg


Oxycodone HCl (Oxycodone)  5 mg PO Q4H PRN


   PRN Reason: Pain (moderate 4-6)


   Last Admin: 04/24/20 06:05 Dose:  5 mg


Pantoprazole Sodium (Protonix***)  40 mg PO ACBREAKFAST [facility]


   Last Admin: 04/24/20 07:05 Dose:  40 mg


Senna/Docusate Sodium (Senna Plus)  1 tab PO BID PRN


   PRN Reason: Constipation


Sodium Chloride (Saline Flush)  10 ml FLUSH ASDIRECTED PRN


   PRN Reason: Keep Vein Open


   Last Admin: 04/23/20 05:44 Dose:  10 ml





Discontinued Medications





Fentanyl (Sublimaze)  50 mcg IVPUSH ONETIME ONE


   Stop: 04/23/20 05:22


   Last Admin: 04/23/20 05:40 Dose:  50 mcg


Hydromorphone HCl (Dilaudid)  0.5 mg IVPUSH ONETIME ONE


   Stop: 04/23/20 07:25


   Last Admin: 04/23/20 10:38 Dose:  Not Given


Hydromorphone HCl (Dilaudid)  0.5 mg IVPUSH Q2H PRN


   PRN Reason: Pain (severe 7-10)


   Last Admin: 04/23/20 09:34 Dose:  0.5 mg


Insulin Regular in 0.9 % NACL (Myxredlin 100 Unit/100 Ml)  100 mls @ 11.748 mls/

hr IV ASDIRECTED [facility]; Protocol


   Last Infusion: 04/23/20 21:59 Dose:  0.02 units/kg/hr, 2 mls/hr


Sodium Chloride (Normal Saline)  1,000 mls @ 999 mls/hr IV ASDIRECTED [facility]


   Last Admin: 04/23/20 05:37 Dose:  999 mls/hr


Sodium Chloride (Normal Saline)  1,000 mls @ 999 mls/hr IV ASDIRECTED [facility]


   Last Admin: 04/23/20 08:22 Dose:  999 mls/hr


Insulin Regular in 0.9 % NACL (Myxredlin 100 Unit/100 Ml)  100 mls @ 8.392 mls/

hr IV ASDIRECTED [facility]; Protocol


   Last Infusion: 04/24/20 08:21 Dose:  0.02 units/kg/hr, 2 mls/hr


Potassium Chloride/Sodium Chloride (Normal Saline With 20 Meq Kcl)  1,000 mls @ 

125 mls/hr IV ASDIRECTED [facility]


Potassium Chloride/Dextrose/Sod Cl (D5 Ns With 20 Meq Kcl)  1,000 mls @ 125 mls/

hr IV ASDIRECTED [facility]


   Last Admin: 04/24/20 01:50 Dose:  125 mls/hr


Potassium Chloride 20 meq/Lidocaine HCl 2 ml/ Sodium Chloride  112 mls @ 56 mls/

hr IV Q2H [facility]


   Stop: 04/23/20 19:59


   Last Admin: 04/23/20 18:05 Dose:  56 mls/hr


Potassium Chloride 20 meq/ (Premix)  100 mls @ 50 mls/hr IV Q2H [facility]


   Stop: 04/24/20 02:59


   Last Admin: 04/24/20 01:15 Dose:  50 mls/hr


Lidocaine HCl (Xylocaine-Mpf 1%)  5 ml INJECT ONETIME ONE


   Stop: 04/23/20 22:43


   Last Admin: 04/23/20 23:15 Dose:  5 ml


Ondansetron HCl (Zofran)  4 mg IVPUSH ONETIME ONE


   Stop: 04/23/20 05:15


   Last Admin: 04/23/20 05:36 Dose:  4 mg


Potassium Chloride (Klor-Con M20)  40 meq PO ONETIME ONE


   Stop: 04/23/20 12:01


   Last Admin: 04/23/20 14:28 Dose:  Not Given











- Exam


Quality Assessment: No: Supplemental Oxygen


General: Alert, Oriented, Cooperative, No Acute Distress


HEENT: Pupils Equal


Lungs: Normal Respiratory Effort


Cardiovascular: Regular Rhythm, Tachycardia


GI/Abdominal Exam: Soft, No Distention


Extremities: No Pedal Edema


Psy/Mental Status: Alert, Normal Affect





Sepsis Event Note





- Evaluation


Sepsis Screening Result: No Definite Risk





- Focused Exam


Vital Signs: 


 Vital Signs











  Temp Pulse Resp BP Pulse Ox


 


 04/24/20 12:53      100


 


 04/24/20 12:00  36.4 C  101 H  20  139/93 H  100


 


 04/24/20 10:00   101 H  17   98


 


 04/24/20 09:17      99


 


 04/24/20 08:40  37.0 C  97  17  113/56 L  98


 


 04/24/20 08:00  36.7 C  99  16  113/71  99


 


 04/24/20 07:00  36.9 C  96  17  113/71  98


 


 04/24/20 06:00    16  110/72  98


 


 04/24/20 05:00    16  119/68  99


 


 04/24/20 04:00  36.3 C   17  110/71  99


 


 04/24/20 03:00    17  129/83  98


 


 04/24/20 02:00    14  113/73  99











Date Exam was Performed: 04/24/20


Time Exam was Performed: 13:59





- Problem List & Annotations


(1) Diabetic ketoacidosis


SNOMED Code(s): 204325252, 695331922


   Code(s): E13.10 - OTH DIABETES MELLITUS WITH KETOACIDOSIS WITHOUT COMA   

Status: Acute   Current Visit: Yes   


Qualifiers: 


   Diabetes mellitus type: type 1   Diabetes mellitus complication detail: 

without coma   Qualified Code(s): E10.10 - Type 1 diabetes mellitus with 

ketoacidosis without coma   





(2) High anion gap metabolic acidosis


SNOMED Code(s): 02790304


   Code(s): E87.2 - ACIDOSIS   Status: Acute   Current Visit: Yes   





(3) Methamphetamine abuse


SNOMED Code(s): 571034332


   Code(s): F15.10 - OTHER STIMULANT ABUSE, UNCOMPLICATED   Status: Chronic   

Current Visit: No   





- Problem List Review


Problem List Initiated/Reviewed/Updated: Yes





- My Orders


Last 24 Hours: 


My Active Orders





04/23/20 21:00


Melatonin   9 mg PO BEDTIME 





04/24/20 08:38


Blood Glucose Check, Bedside [RC] QIDACANDBED 


Communication Order [RC] PRN 


Communication Order [RC] PRN 





04/24/20 08:39


Convert IV to Saline Lock [OM.PC] Routine 





04/24/20 09:00


Insulin Glarg,Human.Rec.Analog [LantUS Solostar]   50 units SUBCUT BID 





04/24/20 10:00


Potassium Chloride 20 meq Lidocaine 1% [Xylocaine 1%] 2 ml   Sodium Chloride 0.9

% [Normal Saline] 100 ml IV Q2H 





04/24/20 11:00


Insulin Lispro [HumaLOG]   See Protocol  SUBCUT QIDACANDBED 





04/24/20 13:39


Communication Order [RC] PRN 


Communication Order [RC] PRN 





04/24/20 17:00


Insulin Lispro [HumaLOG]   10 unit SUBCUT TIDMEALS 





04/24/20 Breakfast


Consistent Carbohydrate Diet [DIET] 





04/25/20 05:00


BASIC METABOLIC PANEL,BMP [CHEM] Timed 














- Plan


Plan:: 





ASSESSMENT AND PLAN - 





Diabetic ketoacidosis-she is off the insulin drip as of this morning.  Anion 

gap has normalized.  Bicarb is up to 20.  Symptomatically she is feeling 

better.  Tachycardia has been improving.  Hemoglobin A1c was greater than 13.


-Discontinue insulin drip


-Start Levemir twice daily


-Short acting insulin 10 units with meals


-Medium dose sliding scale


-BMP in the morning


-Diabetic education





Anion gap metabolic acidosis-secondary to DKA.  No evidence for kidney failure 

at this time.  Anion gap is normal.


-Management as above





Methamphetamine abuse-patient reports using the methamphetamine to help with 

her abdominal pain and myalgias.  She does not believe that she has a problem.  

She does not report regular use of methamphetamines but her very poor dentition 

would suggest otherwise.  There will need to be more discussions with her about 

her methamphetamine use when she is feeling better after diabetic ketoacidosis 

has resolved.


-Offer cessation resources





Maintenance issues - 


- DVT prophylaxis -mechanical


- GI prophylaxis -PPI


- Nutrition -consistent carbohydrate





Disposition -I would anticipate discharge to home after the hospital stay





Primary care physician -wes Garay M.D.

## 2020-04-25 NOTE — PCM.DCSUM1
**Discharge Summary





- Hospital Course


Brief History: 29-year-old female with history of insulin-dependent diabetes 

mellitus who presented with abdominal pain, nausea and vomiting.  She is 

admitted for management of diabetic ketoacidosis with high anion gap metabolic 

acidosis.


Diagnosis: Stroke: No





- Discharge Data


Discharge Date: 04/25/20


Discharge Disposition: Home, Self-Care 01


Condition: Good





- Referral to Home Health


Primary Care Physician: 


PCP None








- Discharge Diagnosis/Problem(s)


(1) Diabetic ketoacidosis


SNOMED Code(s): 680764624, 218884710


   ICD Code: E13.10 - OTH DIABETES MELLITUS WITH KETOACIDOSIS WITHOUT COMA   

Status: Acute   


Qualifiers: 


   Diabetes mellitus type: type 1   Diabetes mellitus complication detail: 

without coma   Qualified Code(s): E10.10 - Type 1 diabetes mellitus with 

ketoacidosis without coma   





(2) High anion gap metabolic acidosis


SNOMED Code(s): 90926704


   ICD Code: E87.2 - ACIDOSIS   Status: Acute   





(3) Methamphetamine abuse


SNOMED Code(s): 735855718


   ICD Code: F15.10 - OTHER STIMULANT ABUSE, UNCOMPLICATED   Status: Chronic   





- Patient Summary/Data


Hospital Course: 





Samina presented to the emergency room with nausea, vomiting and diffuse 

myalgias.  Work-up in the emergency room suggested severe diabetic ketoacidosis 

with a pH of 7 and a bicarb level of around 7.  She had moderate hyperglycemia.

  She did also admit to recent methamphetamine use to help with her diffuse 

myalgias.  She was started on an insulin drip and admitted to the hospital for 

further management.  Over the next 24 hours we did see a slow but steady 

improvement in her elevated anion gap and bicarb levels.  About 24 hours after 

admission her levels did normalize.  She did require some potassium 

supplementation along the way.  Her blood sugar levels improved fairly quickly 

and she did require a dextrose infusion to complete the treatment for diabetic 

ketoacidosis.  Once her anion gap and bicarbonate levels were normal we did 

transition her to subcutaneous insulin.  She tolerated her diet well.  Blood 

sugars have been fairly well controlled using her usual supplemental insulin.  

I did check a hemoglobin A1c which was elevated at more than 13.  The patient 

reported that she had been out of medications for about a week but I suspect 

that she has not been doing very well with her diabetes care even prior to 

this.  From the ketoacidosis standpoint she is doing well and is safe for 

hospital discharge.  We did provide refills of her long and short acting 

insulin which a family member picked up for her the day prior to discharge.  

She will be scheduling a follow-up with a new primary care in the next 1 to 2 

weeks.  She does continue to endorse some epigastric and chest discomfort which 

I suspect is muscular and related to her recent retching and vomiting.  There 

was no evidence for acute coronary syndrome and her vital signs have all been 

stable.  We did also discuss her methamphetamine use.  She does not feel that 

she has a problem with methamphetamine at this time and uses it only 

sporadically when she has significant pain issues.  She was not interested in 

any sort of treatment or cessation information.





- Patient Instructions


Diet: Diabetic Diet


Activity: As Tolerated


Showering/Bathing: May Shower


Notify Provider of: Fever, Increased Pain, Nausea and/or Vomiting


Other/Special Instructions: 1. You were in the hospital for management of 

diabetic ketoacidosis.  Your condition has been improving with IV fluids and IV 

insulin.  We have transitioned you back to her usual doses of insulin including 

both long and short acting versions.  I strongly recommend that you adhere to 

your insulin regimen to avoid further episodes of diabetic ketoacidosis.  Over 

the long-term if your blood sugars are not well controlled you can have 

difficulties with vision, with your heart, with blood vessels and with your 

kidneys.  2. Continue your usual home medications as previously prescribed.  3. 

Follow up with your primary care in 1 to 2 weeks.  4.  I would strongly advise 

you to completely abstain from using methamphetamines.  These have significant 

adverse effects on your body over both the short and long-term.  If you have 

any interest in cessation resources you may contact your primary care provider.





- Discharge Plan


*PRESCRIPTION DRUG MONITORING PROGRAM REVIEWED*: Not Applicable


*COPY OF PRESCRIPTION DRUG MONITORING REPORT IN PATIENT SHELIA: Not Applicable


Prescriptions/Med Rec: 


Insulin Aspart [NovoLOG] 15 unit SUBCUT TIDAC #5 pen


Insulin Detemir [Levemir] 50 unit SUBCUT BID #5 pen


Home Medications: 


 Home Meds





Insulin Aspart [Novolog Flexpen] 15 unit SQ TIDAC 01/03/15 [History]


Insulin Detemir [Levemir] 50 unit SQ BID 01/03/15 [History]


Insulin Aspart [NovoLOG] 15 unit SUBCUT TIDAC #5 pen 04/24/20 [Rx]


Insulin Detemir [Levemir] 50 unit SUBCUT BID #5 pen 04/24/20 [Rx]








Oxygen Therapy Mode: Room Air


Patient Handouts:  Preventing Diabetic Ketoacidosis


Referrals: 


PCP,None [Primary Care Provider] -  (f/u with your primary care in the next 1-2 

weeks or sooner if needed )





- Discharge Summary/Plan Comment


DC Time >30 min.: No





- Patient Data


Vitals - Most Recent: 


 Last Vital Signs











Temp  35.5 C L  04/25/20 06:57


 


Pulse  81   04/25/20 06:57


 


Resp  16   04/25/20 06:57


 


BP  115/70   04/25/20 06:57


 


Pulse Ox  97   04/25/20 06:57











Weight - Most Recent: 92.986 kg


I&O - Last 24 hours: 


 Intake & Output











 04/24/20 04/25/20 04/25/20





 22:59 06:59 14:59


 


Intake Total 1200  


 


Balance 1200  











Lab Results - Last 24 hrs: 


 Laboratory Results - last 24 hr











  04/25/20 Range/Units





  05:30 


 


Sodium  140  (140-148)  mmol/L


 


Potassium  3.0 L  (3.6-5.2)  mmol/L


 


Chloride  107  (100-108)  mmol/L


 


Carbon Dioxide  25  (21-32)  mmol/L


 


Anion Gap  11.0  (5.0-14.0)  mmol/L


 


BUN  5 L D  (7-18)  mg/dL


 


Creatinine  0.5 L  (0.6-1.0)  mg/dL


 


Est Cr Clr Drug Dosing  177.91  mL/min


 


Estimated GFR (MDRD)  > 60  (>60)  


 


Glucose  192 H  ()  mg/dL


 


Calcium  7.6 L  (8.5-10.1)  mg/dL











ETHEL Results - Last 24 hrs: 


 Microbiology











 04/23/20 05:39 Aerobic Blood Culture - Preliminary





 Blood - Venous - Lab Draw    NO GROWTH AFTER 2 DAYS





 Anaerobic Blood Culture - Preliminary





    NO GROWTH AFTER 2 DAYS


 


 04/23/20 05:30 Aerobic Blood Culture - Preliminary





 Blood - Venous    NO GROWTH AFTER 2 DAYS





 Anaerobic Blood Culture - Preliminary





    NO GROWTH AFTER 2 DAYS











Med Orders - Current: 


 Current Medications





Acetaminophen (Tylenol)  650 mg PO Q4H PRN


   PRN Reason: Pain (Mild 1-3)/fever


   Last Admin: 04/25/20 07:07 Dose:  650 mg


Insulin Glargine (Lantus Solostar)  50 units SUBCUT BID [facility]


   Last Admin: 04/24/20 21:17 Dose:  50 units


Insulin Human Lispro (Humalog)  0 unit SUBCUT QIDACANDBED [facility]; Protocol


   Last Admin: 04/25/20 07:57 Dose:  2 units


Insulin Human Lispro (Humalog)  10 unit SUBCUT TIDMEALS [facility]


   Last Admin: 04/25/20 07:59 Dose:  10 units


Lorazepam (Ativan)  0.5 mg IVPUSH Q4H PRN


   PRN Reason: Nausea/Vomiting


   Last Admin: 04/23/20 21:12 Dose:  0.5 mg


Magnesium Hydroxide (Milk Of Magnesia)  30 ml PO Q12H PRN


   PRN Reason: Constipation


Melatonin (Melatonin)  9 mg PO BEDTIME [facility]


   Last Admin: 04/24/20 21:18 Dose:  9 mg


Ondansetron HCl (Zofran Odt)  4 mg PO Q6H PRN


   PRN Reason: Nausea able to take PO


Ondansetron HCl (Zofran)  4 mg IV Q6H PRN


   PRN Reason: Nausea/Vomiting


   Last Admin: 04/23/20 17:33 Dose:  4 mg


Oxycodone HCl (Oxycodone)  5 mg PO Q4H PRN


   PRN Reason: Pain (moderate 4-6)


   Last Admin: 04/25/20 07:07 Dose:  5 mg


Pantoprazole Sodium (Protonix***)  40 mg PO ACBREAKFAST [facility]


   Last Admin: 04/25/20 07:29 Dose:  40 mg


Potassium Chloride (Klor-Con M20)  40 meq PO ONETIME ONE


   Stop: 04/25/20 09:01


Senna/Docusate Sodium (Senna Plus)  1 tab PO BID PRN


   PRN Reason: Constipation


Sodium Chloride (Saline Flush)  10 ml FLUSH ASDIRECTED PRN


   PRN Reason: Keep Vein Open


   Last Admin: 04/23/20 05:44 Dose:  10 ml





Discontinued Medications





Fentanyl (Sublimaze)  50 mcg IVPUSH ONETIME ONE


   Stop: 04/23/20 05:22


   Last Admin: 04/23/20 05:40 Dose:  50 mcg


Hydromorphone HCl (Dilaudid)  0.5 mg IVPUSH ONETIME ONE


   Stop: 04/23/20 07:25


   Last Admin: 04/23/20 10:38 Dose:  Not Given


Hydromorphone HCl (Dilaudid)  0.5 mg IVPUSH Q2H PRN


   PRN Reason: Pain (severe 7-10)


   Last Admin: 04/23/20 09:34 Dose:  0.5 mg


Insulin Regular in 0.9 % NACL (Myxredlin 100 Unit/100 Ml)  100 mls @ 11.748 mls/

hr IV ASDIRECTED [facility]; Protocol


   Last Infusion: 04/23/20 21:59 Dose:  0.02 units/kg/hr, 2 mls/hr


Sodium Chloride (Normal Saline)  1,000 mls @ 999 mls/hr IV ASDIRECTED [facility]


   Last Admin: 04/23/20 05:37 Dose:  999 mls/hr


Sodium Chloride (Normal Saline)  1,000 mls @ 999 mls/hr IV ASDIRECTED [facility]


   Last Admin: 04/23/20 08:22 Dose:  999 mls/hr


Insulin Regular in 0.9 % NACL (Myxredlin 100 Unit/100 Ml)  100 mls @ 8.392 mls/

hr IV ASDIRECTED JUAN; Protocol


   Last Infusion: 04/24/20 08:21 Dose:  0.02 units/kg/hr, 2 mls/hr


Potassium Chloride/Sodium Chloride (Normal Saline With 20 Meq Kcl)  1,000 mls @ 

125 mls/hr IV ASDIRECTED JUAN


Potassium Chloride/Dextrose/Sod Cl (D5 Ns With 20 Meq Kcl)  1,000 mls @ 125 mls/

hr IV ASDIRECTED JUAN


   Last Admin: 04/24/20 01:50 Dose:  125 mls/hr


Potassium Chloride 20 meq/Lidocaine HCl 2 ml/ Sodium Chloride  112 mls @ 56 mls/

hr IV Q2H [facility]


   Stop: 04/23/20 19:59


   Last Admin: 04/23/20 18:05 Dose:  56 mls/hr


Potassium Chloride 20 meq/ (Premix)  100 mls @ 50 mls/hr IV Q2H JUAN


   Stop: 04/24/20 02:59


   Last Admin: 04/24/20 01:15 Dose:  50 mls/hr


Potassium Chloride 20 meq/Lidocaine HCl 2 ml/ Sodium Chloride  112 mls @ 56 mls/

hr IV Q2H JUAN


   Stop: 04/24/20 13:59


   Last Admin: 04/24/20 12:38 Dose:  56 mls/hr


Lidocaine HCl (Xylocaine-Mpf 1%)  5 ml INJECT ONETIME ONE


   Stop: 04/23/20 22:43


   Last Admin: 04/23/20 23:15 Dose:  5 ml


Ondansetron HCl (Zofran)  4 mg IVPUSH ONETIME ONE


   Stop: 04/23/20 05:15


   Last Admin: 04/23/20 05:36 Dose:  4 mg


Potassium Chloride (Klor-Con M20)  40 meq PO ONETIME ONE


   Stop: 04/23/20 12:01


   Last Admin: 04/23/20 14:28 Dose:  Not Given











- Exam


Quality Assessment: Denies: Supplemental Oxygen


General: Reports: Alert, Oriented, Cooperative, No Acute Distress


HEENT: Reports: Other (poor dentition)


Cardiovascular: Reports: Regular Rate, Regular Rhythm


GI/Abdominal Exam: Soft, No Distention


Extremities: No Pedal Edema


Psy/Mental Status: Reports: Alert, Normal Affect

## 2020-08-16 NOTE — PCM.HP.2
H&P History of Present Illness





- General


Date of Service: 08/16/20


Admit Problem/Dx: 


                           Admission Diagnosis/Problem





Admission Diagnosis/Problem      Diabetic ketoacidosis without coma








Source of Information: Patient, Provider


History Limitations: Reports: No Limitations





- History of Present Illness


Initial Comments - Free Text/Narative: 





CC: I keep puking up





HPI: Samina presents to the emergency room today with abdominal pain, nausea 

and vomiting.  Symptoms started yesterday and have progressed over that time.  

She describes her abdominal pain as achy generalized pain.  Pain has been 

relatively constant but does get worse when she vomits.  She did not take 

anything at home to make the pain better.  Pain is slowly been getting worse.  

She has had multiple episodes of nausea with vomiting and has been unable to 

keep down any food and only small quantities of liquid.  She reports that she 

ran out of her insulin a couple of days ago.  She thought she had enough to get 

through until her appointment 2 days from now but came up several days short.  

Yesterday she had polyuria but today her urine output seems to be less than 

usual.  No complaints of shortness of breath or fever but she does report a mild

cough.  No change in bowel habits.  No obvious sick contacts.  She does have 

several open abdominal wounds from abscess debridement and these are healing.  

She is getting daily wound care from home care.  She is not currently on 

antibiotics.





Work-up in the emergency room revealed evidence for diabetic ketoacidosis with a

pH of 7.1 on venous blood gases.  Blood sugar is nearly 400.  Anion gap is 30 

and her bicarb is only 8.  She has received IV fluids and a dose of 

metoclopramide.  She is going to be admitted for management of diabetic 

ketoacidosis.


  ** Abdomen


Pain Score (Numeric/FACES): 7





- Related Data


Allergies/Adverse Reactions: 


                                    Allergies











Allergy/AdvReac Type Severity Reaction Status Date / Time


 


ibuprofen Allergy Unknown Swelling Verified 08/16/20 19:36











Home Medications: 


                                    Home Meds





Insulin Aspart [Novolog Flexpen] 5 - 15 unit SQ TIDAC 01/03/15 [History]


Insulin Detemir [Levemir] 30 unit SQ ACBREAKFAST 01/03/15 [History]


Insulin Detemir [Levemir] 40 units SQ ACDINNER 08/16/20 [History]











Past Medical History


Gastrointestinal History: Reports: Cholelithiasis, Other (See Below)


Other Gastrointestinal History: 90# weight loss in past year


Musculoskeletal History: Reports: Other (See Below)


Other Musculoskeletal History: chronic left knee pain


Psychiatric History: Reports: Addiction


Endocrine/Metabolic History: Reports: Diabetes, Type I, IDDM


Hematologic History: Reports: Anemia


Dermatologic History: Reports: Other (See Below)


Other Dermatologic History: boils on buttock and abdomin





- Infectious Disease History


Infectious Disease History: Reports: Chicken Pox, MRSA


Other Infectious Disease History: MRSA HX OF IN ABD. WOUND





- Past Surgical History


GI Surgical History: Reports: Cholecystectomy


Endocrine Surgical History: Reports: None


Musculoskeletal Surgical History: Reports: None


Dermatological Surgical History: Reports: None





Social & Family History





- Family History


Family Medical History: Noncontributory





- Tobacco Use


Smoking Status *Q: Current Every Day Smoker


Years of Tobacco use: 10


Packs/Tins Daily: 2





- Caffeine Use


Caffeine Use: Reports: None





- Recreational Drug Use


Recreational Drug Use: Yes


Recreational Drug Type: Reports: Marijuana/Hashish





- Living Situation & Occupation


Living situation: Reports: Single





H&P Review of Systems





- Review of Systems:


Review Of Systems: See Below


Free Text/Narrative: 





A complete 12 point review of systems was obtained.  Pertinent positives and 

negatives are noted in the history of present illness.  All other systems were 

reviewed and were negative except as noted.





Exam





- Exam


Exam: See Below





- Vital Signs


Vital Signs: 


                                Last Vital Signs











Temp  36.3 C   08/16/20 17:17


 


Pulse  125 H  08/16/20 17:58


 


Resp  16   08/16/20 17:58


 


BP  131/91 H  08/16/20 17:58


 


Pulse Ox  99   08/16/20 17:58











Weight: 90.718 kg





- Exam


Quality Assessment: No: Supplemental Oxygen


General: Alert, Oriented, Cooperative.  No: Mild Distress


HEENT: Conjunctiva Clear, Other (very poor dentition, missing multiple teeth ). 

 No: Mucosa Moist & Pink (dry)


Neck: Supple, Trachea Midline.  No: Lymphadenopathy


Lungs: Clear to Auscultation, Normal Respiratory Effort


Cardiovascular: Regular Rhythm, Tachycardia.  No: Systolic Murmur


GI/Abdominal Exam: Normal Bowel Sounds, Soft, No Distention, Tender (moderate 

diffuse).  No: Guarding


Extremities: No Pedal Edema.  No: Increased Warmth


Skin: Warm, Dry, Wound (four sites on her abdomen are covered by adherent 

dressings. No surrounding erythema )


Neuro Extensive - Mental Status: Alert, Oriented x3, Nl Response to Commands


Neuro Extensive - Motor, Sensory, Reflexes: No: Dysarthria, Abnormal Motor, 

Tremor


Psychiatric: Alert, Normal Affect





- Patient Data


Lab Results Last 24 hrs: 


                         Laboratory Results - last 24 hr











  08/16/20 08/16/20 08/16/20 Range/Units





  17:09 17:09 17:09 


 


WBC  11.7 H    (4.5-11.0)  K/uL


 


RBC  5.87 H    (3.30-5.50)  M/uL


 


Hgb  14.9  D    (12.0-15.0)  g/dL


 


Hct  49.9 H    (36.0-48.0)  %


 


MCV  85    (80-98)  fL


 


MCH  25 L    (27-31)  pg


 


MCHC  30 L    (32-36)  %


 


Plt Count  427 H    (150-400)  K/uL


 


VBG pH   7.142 L   (7.350-7.450)  


 


Sodium    135 L  (140-148)  mmol/L


 


Potassium    4.4  (3.6-5.2)  mmol/L


 


Chloride    99 L  (100-108)  mmol/L


 


Carbon Dioxide    8 L D  (21-32)  mmol/L


 


Anion Gap    32.4 H  (5.0-14.0)  mmol/L


 


BUN    20 H D  (7-18)  mg/dL


 


Creatinine    0.9  D  (0.6-1.0)  mg/dL


 


Est Cr Clr Drug Dosing    97.94  mL/min


 


Estimated GFR (MDRD)    > 60  (>60)  


 


Glucose    370 H  ()  mg/dL


 


Calcium    8.7  (8.5-10.1)  mg/dL











Result Diagrams: 


                                 08/18/20 05:00





                                 08/18/20 05:00





Sepsis Event Note





- Evaluation


Sepsis Screening Result: No Definite Risk





- Focused Exam


Vital Signs: 


                                   Vital Signs











  Temp Pulse Resp BP Pulse Ox


 


 08/16/20 17:58   125 H  16  131/91 H  99


 


 08/16/20 17:17  36.3 C  126 H  13  107/52 L  100


 


 08/16/20 17:16  36.3 C  126 H  13  107/52 L  100


 


 08/16/20 16:58  36.3 C  126 H  13  107/52 L  100














*Q Meaningful Use (ADM)





- VTE Risk Assess *Q


Each Risk Factor Represents 1 Point: Obesity ( BMI > 25 kg/m2)


Total Score 1 Point Risk Factors: 1


Each Risk Factor Represents 2 Points: None


Total Score 2 Point Risk Factors: 0


Each Risk Factor Represents 3 Points: None


Total Score 3 Point Risk Factors: 0


Each Risk Factor Represents 5 Points: None


Total Score 5 Point Risk Factors: 0


Venous Thromboembolism Risk Factor Score *Q: 1





- Problem List


(1) Diabetic ketoacidosis


SNOMED Code(s): 636217011, 958241331


   ICD Code: E13.10 - OTH DIABETES MELLITUS WITH KETOACIDOSIS WITHOUT COMA   

Status: Acute   


Qualifiers: 


   Diabetes mellitus type: type 1   Diabetes mellitus complication detail: 

without coma   Qualified Code(s): E10.10 - Type 1 diabetes mellitus with 

ketoacidosis without coma   





(2) High anion gap metabolic acidosis


SNOMED Code(s): 62260140


   ICD Code: E87.2 - ACIDOSIS   Status: Acute   


Problem List Initiated/Reviewed/Updated: Yes


Orders Last 24hrs: 


                               Active Orders 24 hr











 Category Date Time Status


 


 Patient Status Manage Transfer [TRANSFER] Routine ADT  08/16/20 18:03 Ordered


 


 DRUG SCREEN, URINE [URCHEM] Stat Lab  08/16/20 16:57 Ordered


 


 UA W/MICROSCOPIC [URIN] Stat Lab  08/16/20 16:56 Ordered


 


 Resuscitation Status Routine Resus Stat  08/16/20 18:04 Ordered











Assessment/Plan Comment:: 





ASSESSMENT AND PLAN - 





Diabetic ketoacidosis without coma-progressive symptoms over 24 to 48 hours.  

Patient ran out of insulin.  No evidence for infection.  She has associated 

abdominal pain with nausea and vomiting.  Kidney function normal.


-Low-dose insulin drip


-Normal saline infusion


-I anticipate she will need dextrose containing solution to keep her blood 

sugars in the normal range for her anion gap closes


-BMP every 4 hours until her anion gap is normal


-Transition to subcutaneous insulin once anion gap normal


-Diabetes education





High anion gap metabolic acidosis-secondary to DKA.


-Management as above





Recent abdominal abscesses-not currently on antibiotics.  She is receiving daily

 wound care.





Maintenance issues - 


- DVT prophylaxis -SCDs


- GI prophylaxis -one-time dose of PPI tonight


- Nutrition -clear liquids


- Franco catheter -not indicated





CODE STATUS -full code





Admission justification -this patient will be admitted for inpatient services 

and is medically appropriate meeting medical necessity for inpatient admission 

as outlined in my documentation.  I reasonably expect the patient will require 

inpatient services that span a period time over 2 midnights. I reasonably expect

 this patient to be discharged or transferred within 96 hours after [facility]





Disposition -I would anticipate discharge home after the hospital stay





Primary care physician -[facility] in [place]





Erickson Garay M.D.





- Mortality Measure


Prognosis:: Good

## 2020-08-16 NOTE — EDM.PDOC
ED HPI GENERAL MEDICAL PROBLEM





- General


Chief Complaint: Gastrointestinal Problem


Stated Complaint: VOMITING, HIGH BLOOD SUGARS


Time Seen by Provider: 08/16/20 16:55


Source of Information: Reports: Patient, Old Records


History Limitations: Reports: No Limitations





- History of Present Illness


INITIAL COMMENTS - FREE TEXT/NARRATIVE: 





31 yo NA female from [place] with DM presents with onset yesterday of polyuria 

and today nausea and vomiting. She is on injectable insulin, but does not use a 

glucose meter. Her primary is in [place]. She has a pHx of MRSA 

infections. Was here just a few months ago for DKA. Denies any hematemesis or 

melena and no abdominal distention. No fever or chest pain. Says she thinks her 

children got into her insulin so she ran out recently. 


Onset: Gradual


Onset Date: 08/15/20


Duration: Day(s): (1.5), Getting Worse


Location: Reports: Abdomen


Quality: Reports: Other (mild)


Severity: Mild


Improves with: Reports: None


Worsens with: Reports: Other (repetitive vomiting)


Context: Reports: Other (See HPI)


Associated Symptoms: Reports: Nausea/Vomiting.  Denies: Fever/Chills


Treatments PTA: Reports: Other (see below) (none)





- Related Data


                                    Allergies











Allergy/AdvReac Type Severity Reaction Status Date / Time


 


ibuprofen Allergy Unknown Swelling Verified 04/23/20 05:13











Home Meds: 


                                    Home Meds





Insulin Aspart [Novolog Flexpen] 15 unit SQ TIDAC 01/03/15 [History]


Insulin Detemir [Levemir] 50 unit SQ BID 01/03/15 [History]


Insulin Aspart [NovoLOG] 15 unit SUBCUT TIDAC #5 pen 04/24/20 [Rx]


Insulin Detemir [Levemir] 50 unit SUBCUT BID #5 pen 04/24/20 [Rx]











Past Medical History


Gastrointestinal History: Reports: Cholelithiasis, Other (See Below)


Other Gastrointestinal History: 90# weight loss in past year


Musculoskeletal History: Reports: Other (See Below)


Other Musculoskeletal History: chronic left knee pain


Psychiatric History: Reports: Addiction


Endocrine/Metabolic History: Reports: Diabetes, Type I, IDDM


Hematologic History: Reports: Anemia


Dermatologic History: Reports: Other (See Below)


Other Dermatologic History: boils on buttock and abdomin





- Infectious Disease History


Infectious Disease History: Reports: Chicken Pox





- Past Surgical History


GI Surgical History: Reports: Cholecystectomy





Social & Family History





- Family History


Family Medical History: Noncontributory





- Caffeine Use


Caffeine Use: Reports: Coffee





- Living Situation & Occupation


Living situation: Reports: Single





ED ROS GENERAL





- Review of Systems


Review Of Systems: See Below


Constitutional: Reports: Malaise


HEENT: Reports: No Symptoms


Respiratory: Reports: No Symptoms


Cardiovascular: Reports: Lightheadedness


Endocrine: Reports: High Glucose (assumed by patient as she does not actually 

check it. )


GI/Abdominal: Reports: Abdominal Pain (minimal), Nausea, Vomiting.  Denies: 

Black Stool, Bloody Stool, Constipation, Diarrhea, Distension, Flatus, 

Hematemesis, Hematochezia, Melena


: Reports: Frequency


Musculoskeletal: Reports: No Symptoms


Skin: Reports: Other (truncal wounds from drainage of MRSA abscesses)


Neurological: Reports: No Symptoms





ED EXAM, GI/ABD





- Physical Exam


Exam: See Below


Exam Limited By: No Limitations


General Appearance: Alert, WD/WN, Mild Distress


Eyes: Bilateral: Normal Appearance


Ears: Normal External Exam, Normal Canal, Hearing Grossly Normal


Nose: Normal Inspection, No Blood


Throat/Mouth: Normal Lips, Normal Oropharynx, Normal Voice, No Airway 

Compromise, Other (mildly dry oral mucosa).  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


Head: Atraumatic, Normocephalic


Neck: Normal Inspection


Respiratory/Chest: No Respiratory Distress, Lungs Clear, Normal Breath Sounds, 

No Accessory Muscle Use


Cardiovascular: Regular Rate, Rhythm


GI/Abdominal Exam: Normal Bowel Sounds, Soft, Non-Tender, No Distention, Other 

(striae)


Back Exam: Normal Inspection.  No: CVA Tenderness (R), CVA Tenderness (L)


Extremities: Normal Inspection, Normal Range of Motion, Non-Tender, No Pedal 

Edema.  No: Pedal Edema


Neurological: Alert, Oriented, CN II-XII Intact, Normal Cognition, No 

Motor/Sensory Deficits


Psychiatric: Normal Affect, Normal Mood


Skin Exam: Warm, Dry, Intact, Normal Color, No Rash





Course





- Vital Signs


Text/Narrative:: 





Dr. Garay called @ 9685h


Last Recorded V/S: 


                                Last Vital Signs











Temp  36.3 C   08/16/20 17:16


 


Pulse  126 H  08/16/20 17:16


 


Resp  13   08/16/20 17:16


 


BP  107/52 L  08/16/20 17:16


 


Pulse Ox  100   08/16/20 17:16














- Orders/Labs/Meds


Orders: 


                               Active Orders 24 hr











 Category Date Time Status


 


 BASIC METABOLIC PANEL,BMP [CHEM] Stat Lab  08/16/20 16:56 Ordered


 


 DRUG SCREEN, URINE [URCHEM] Stat Lab  08/16/20 16:57 Ordered


 


 UA W/MICROSCOPIC [URIN] Stat Lab  08/16/20 16:56 Ordered


 


 Lactated Ringers [Ringers, Lactated] 1,000 ml Med  08/16/20 16:56 Active





 IV BOLUS   








                                Medication Orders





Lactated Ringer's (Ringers, Lactated)  1,000 mls @ 1,000 mls/hr IV BOLUS ONE


   Stop: 08/16/20 17:55








Labs: 


                                Laboratory Tests











  08/16/20 08/16/20 Range/Units





  17:09 17:09 


 


WBC  11.7 H   (4.5-11.0)  K/uL


 


RBC  5.87 H   (3.30-5.50)  M/uL


 


Hgb  14.9  D   (12.0-15.0)  g/dL


 


Hct  49.9 H   (36.0-48.0)  %


 


MCV  85   (80-98)  fL


 


MCH  25 L   (27-31)  pg


 


MCHC  30 L   (32-36)  %


 


Plt Count  427 H   (150-400)  K/uL


 


VBG pH   7.142 L  (7.350-7.450)  











Meds: 


Medications











Generic Name Dose Route Start Last Admin





  Trade Name Freq  PRN Reason Stop Dose Admin


 


Lactated Ringer's  1,000 mls @ 1,000 mls/hr  08/16/20 16:56 





  Ringers, Lactated  IV  08/16/20 17:55 





  BOLUS ONE  














Discontinued Medications














Generic Name Dose Route Start Last Admin





  Trade Name Freq  PRN Reason Stop Dose Admin


 


Metoclopramide HCl  10 mg  08/16/20 16:57 





  Reglan  IVPUSH  08/16/20 16:58 





  ONETIME ONE  














Departure





- Departure


Time of Disposition: 17:45


Disposition: Admitted As Inpatient 65


Condition: Poor


Clinical Impression: 


DKA (diabetic ketoacidoses)


Qualifiers:


 Diabetes mellitus type: type 1 Diabetes mellitus complication detail: without 

coma Qualified Code(s): E10.10 - Type 1 diabetes mellitus with ketoacidosis 

without coma








- Discharge Information


*PRESCRIPTION DRUG MONITORING PROGRAM REVIEWED*: No


*COPY OF PRESCRIPTION DRUG MONITORING REPORT IN PATIENT SHELIA: No


Referrals: 


PCP,None [Primary Care Provider] - 


Forms:  ED Department Discharge





Sepsis Event Note (ED)





- Focused Exam


Vital Signs: 


                                   Vital Signs











  Temp Pulse Resp BP Pulse Ox


 


 08/16/20 17:16  36.3 C  126 H  13  107/52 L  100


 


 08/16/20 16:58  36.3 C  126 H  13  107/52 L  100














- My Orders


Last 24 Hours: 


My Active Orders





08/16/20 16:56


BASIC METABOLIC PANEL,BMP [CHEM] Stat 


UA W/MICROSCOPIC [URIN] Stat 


Lactated Ringers [Ringers, Lactated] 1,000 ml IV BOLUS 





08/16/20 16:57


DRUG SCREEN, URINE [URCHEM] Stat 














- Assessment/Plan


Last 24 Hours: 


My Active Orders





08/16/20 16:56


BASIC METABOLIC PANEL,BMP [CHEM] Stat 


UA W/MICROSCOPIC [URIN] Stat 


Lactated Ringers [Ringers, Lactated] 1,000 ml IV BOLUS 





08/16/20 16:57


DRUG SCREEN, URINE [URCHEM] Stat

## 2020-08-17 NOTE — PCM.PN
- General Info


Date of Service: 08/17/20


Subjective Update: 





Ms. Guo is a 30-year-old woman who was admitted through the emergency 

department last night with diabetic ketoacidosis.  She had experienced nausea 

and vomiting and ran out of her long-acting insulin.  She has been treated with 

continuous infusion of IV insulin since last night.  Blood glucose levels have 

improved and there has been improvement in her ketoacidosis although not 

resolved totally.  She is feeling well and denies significant pain.


Functional Status: Reports: Tolerating Diet, Ambulating, Urinating





- Review of Systems


General: Reports: No Symptoms


Pulmonary: Reports: No Symptoms


Cardiovascular: Reports: No Symptoms


Gastrointestinal: Reports: No Symptoms





- Patient Data


Vitals - Most Recent: 


                                Last Vital Signs











Temp  97.1 F   08/17/20 11:58


 


Pulse  104 H  08/17/20 11:58


 


Resp  20   08/17/20 11:58


 


BP  110/67   08/17/20 11:58


 


Pulse Ox  98   08/17/20 11:58











Weight - Most Recent: 178 lb 8 oz


I&O - Last 24 Hours: 


                                 Intake & Output











 08/16/20 08/17/20 08/17/20





 22:59 06:59 14:59


 


Intake Total  3627 400


 


Output Total  900 


 


Balance  2727 400











Lab Results Last 24 Hours: 


                         Laboratory Results - last 24 hr











  08/16/20 08/16/20 08/16/20 Range/Units





  17:09 17:09 17:09 


 


WBC  11.7 H    (4.5-11.0)  K/uL


 


RBC  5.87 H    (3.30-5.50)  M/uL


 


Hgb  14.9  D    (12.0-15.0)  g/dL


 


Hct  49.9 H    (36.0-48.0)  %


 


MCV  85    (80-98)  fL


 


MCH  25 L    (27-31)  pg


 


MCHC  30 L    (32-36)  %


 


Plt Count  427 H    (150-400)  K/uL


 


VBG pH   7.142 L   (7.350-7.450)  


 


Sodium    135 L  (140-148)  mmol/L


 


Potassium    4.4  (3.6-5.2)  mmol/L


 


Chloride    99 L  (100-108)  mmol/L


 


Carbon Dioxide    8 L D  (21-32)  mmol/L


 


Anion Gap    32.4 H  (5.0-14.0)  mmol/L


 


BUN    20 H D  (7-18)  mg/dL


 


Creatinine    0.9  D  (0.6-1.0)  mg/dL


 


Est Cr Clr Drug Dosing    97.94  mL/min


 


Estimated GFR (MDRD)    > 60  (>60)  


 


Glucose    370 H  ()  mg/dL


 


Calcium    8.7  (8.5-10.1)  mg/dL


 


Magnesium     (1.8-2.4)  mg/dL


 


Urine Color     (YELLOW)  


 


Urine Appearance     (CLEAR)  


 


Urine pH     (5.0-8.0)  


 


Ur Specific Gravity     (1.008-1.030)  


 


Urine Protein     (NEGATIVE)  mg/dL


 


Urine Glucose (UA)     (NEGATIVE)  mg/dL


 


Urine Ketones     (NEGATIVE)  mg/dL


 


Urine Occult Blood     (NEGATIVE)  


 


Urine Nitrite     (NEGATIVE)  


 


Urine Bilirubin     (NEGATIVE)  


 


Urine Urobilinogen     (0.2-1.0)  EU/dL


 


Ur Leukocyte Esterase     (NEGATIVE)  


 


Urine RBC     (0-5)  


 


Urine WBC     (0-5)  


 


Ur Epithelial Cells     


 


Amorphous Sediment     


 


Urine Bacteria     


 


Urine Mucus     


 


Urine Opiates Screen     (NEGATIVE)  


 


Ur Oxycodone Screen     (NEGATIVE)  


 


Urine Methadone Screen     (NEGATIVE)  


 


Ur Propoxyphene Screen     (NEGATIVE)  


 


Ur Barbiturates Screen     (NEGATIVE)  


 


Ur Tricyclics Screen     (NEGATIVE)  


 


Ur Phencyclidine Scrn     (NEGATIVE)  


 


Ur Amphetamine Screen     (NEGATIVE)  


 


U Methamphetamines Scrn     (NEGATIVE)  


 


Urine MDMA Screen     (NEGATIVE)  


 


U Benzodiazepines Scrn     (NEGATIVE)  


 


U Cocaine Metab Screen     (NEGATIVE)  


 


U Marijuana (THC) Screen     (NEGATIVE)  














  08/16/20 08/16/20 08/16/20 Range/Units





  21:00 23:00 23:00 


 


WBC     (4.5-11.0)  K/uL


 


RBC     (3.30-5.50)  M/uL


 


Hgb     (12.0-15.0)  g/dL


 


Hct     (36.0-48.0)  %


 


MCV     (80-98)  fL


 


MCH     (27-31)  pg


 


MCHC     (32-36)  %


 


Plt Count     (150-400)  K/uL


 


VBG pH     (7.350-7.450)  


 


Sodium  139 L    (140-148)  mmol/L


 


Potassium  3.9    (3.6-5.2)  mmol/L


 


Chloride  106    (100-108)  mmol/L


 


Carbon Dioxide  11 L    (21-32)  mmol/L


 


Anion Gap  25.9 H    (5.0-14.0)  mmol/L


 


BUN  16    (7-18)  mg/dL


 


Creatinine  0.9    (0.6-1.0)  mg/dL


 


Est Cr Clr Drug Dosing  97.94    mL/min


 


Estimated GFR (MDRD)  > 60    (>60)  


 


Glucose  277 H    ()  mg/dL


 


Calcium  8.2 L    (8.5-10.1)  mg/dL


 


Magnesium     (1.8-2.4)  mg/dL


 


Urine Color   Yellow   (YELLOW)  


 


Urine Appearance   Clear   (CLEAR)  


 


Urine pH   5.5   (5.0-8.0)  


 


Ur Specific Gravity   >= 1.030   (1.008-1.030)  


 


Urine Protein   100 H   (NEGATIVE)  mg/dL


 


Urine Glucose (UA)   500 H   (NEGATIVE)  mg/dL


 


Urine Ketones   >=160 H   (NEGATIVE)  mg/dL


 


Urine Occult Blood   Negative   (NEGATIVE)  


 


Urine Nitrite   Negative   (NEGATIVE)  


 


Urine Bilirubin   Small H   (NEGATIVE)  


 


Urine Urobilinogen   0.2   (0.2-1.0)  EU/dL


 


Ur Leukocyte Esterase   Negative   (NEGATIVE)  


 


Urine RBC   0-5   (0-5)  


 


Urine WBC   0-5   (0-5)  


 


Ur Epithelial Cells   Moderate   


 


Amorphous Sediment   Not seen   


 


Urine Bacteria   Few   


 


Urine Mucus   Not seen   


 


Urine Opiates Screen    Negative  (NEGATIVE)  


 


Ur Oxycodone Screen    Negative  (NEGATIVE)  


 


Urine Methadone Screen    Negative  (NEGATIVE)  


 


Ur Propoxyphene Screen    Negative  (NEGATIVE)  


 


Ur Barbiturates Screen    Negative  (NEGATIVE)  


 


Ur Tricyclics Screen    Negative  (NEGATIVE)  


 


Ur Phencyclidine Scrn    Negative  (NEGATIVE)  


 


Ur Amphetamine Screen    Negative  (NEGATIVE)  


 


U Methamphetamines Scrn    Negative  (NEGATIVE)  


 


Urine MDMA Screen    Negative  (NEGATIVE)  


 


U Benzodiazepines Scrn    Negative  (NEGATIVE)  


 


U Cocaine Metab Screen    Negative  (NEGATIVE)  


 


U Marijuana (THC) Screen    Negative  (NEGATIVE)  














  08/17/20 08/17/20 08/17/20 Range/Units





  01:00 05:55 05:55 


 


WBC   9.0   (4.5-11.0)  K/uL


 


RBC   4.63   (3.30-5.50)  M/uL


 


Hgb   12.0  D   (12.0-15.0)  g/dL


 


Hct   38.4   (36.0-48.0)  %


 


MCV   83   (80-98)  fL


 


MCH   26 L   (27-31)  pg


 


MCHC   31 L   (32-36)  %


 


Plt Count   344   (150-400)  K/uL


 


VBG pH     (7.350-7.450)  


 


Sodium  135 L   135 L  (140-148)  mmol/L


 


Potassium  3.8   3.8  (3.6-5.2)  mmol/L


 


Chloride  105   105  (100-108)  mmol/L


 


Carbon Dioxide  15 L   17 L  (21-32)  mmol/L


 


Anion Gap  18.8 H   16.8 H  (5.0-14.0)  mmol/L


 


BUN  13   11  (7-18)  mg/dL


 


Creatinine  0.7   0.7  (0.6-1.0)  mg/dL


 


Est Cr Clr Drug Dosing  125.92   125.92  mL/min


 


Estimated GFR (MDRD)  > 60   > 60  (>60)  


 


Glucose  221 H   193 H  ()  mg/dL


 


Calcium  7.8 L   7.9 L  (8.5-10.1)  mg/dL


 


Magnesium    1.5 L  (1.8-2.4)  mg/dL


 


Urine Color     (YELLOW)  


 


Urine Appearance     (CLEAR)  


 


Urine pH     (5.0-8.0)  


 


Ur Specific Gravity     (1.008-1.030)  


 


Urine Protein     (NEGATIVE)  mg/dL


 


Urine Glucose (UA)     (NEGATIVE)  mg/dL


 


Urine Ketones     (NEGATIVE)  mg/dL


 


Urine Occult Blood     (NEGATIVE)  


 


Urine Nitrite     (NEGATIVE)  


 


Urine Bilirubin     (NEGATIVE)  


 


Urine Urobilinogen     (0.2-1.0)  EU/dL


 


Ur Leukocyte Esterase     (NEGATIVE)  


 


Urine RBC     (0-5)  


 


Urine WBC     (0-5)  


 


Ur Epithelial Cells     


 


Amorphous Sediment     


 


Urine Bacteria     


 


Urine Mucus     


 


Urine Opiates Screen     (NEGATIVE)  


 


Ur Oxycodone Screen     (NEGATIVE)  


 


Urine Methadone Screen     (NEGATIVE)  


 


Ur Propoxyphene Screen     (NEGATIVE)  


 


Ur Barbiturates Screen     (NEGATIVE)  


 


Ur Tricyclics Screen     (NEGATIVE)  


 


Ur Phencyclidine Scrn     (NEGATIVE)  


 


Ur Amphetamine Screen     (NEGATIVE)  


 


U Methamphetamines Scrn     (NEGATIVE)  


 


Urine MDMA Screen     (NEGATIVE)  


 


U Benzodiazepines Scrn     (NEGATIVE)  


 


U Cocaine Metab Screen     (NEGATIVE)  


 


U Marijuana (THC) Screen     (NEGATIVE)  











Med Orders - Current: 


                               Current Medications





Acetaminophen (Tylenol)  650 mg PO Q4H PRN


   PRN Reason: Pain (Mild 1-3)/fever


Hydromorphone HCl (Dilaudid)  0.5 mg IVPUSH Q2H PRN


   PRN Reason: Pain (severe 7-10)


   Last Admin: 08/17/20 01:54 Dose:  0.5 mg


   Documented by: 


Insulin Regular in 0.9 % NACL (Myxredlin 100 Unit/100 Ml)  100 mls @ 9.072 

mls/hr IV ASDIRECTED [facility]; Protocol


   Last Infusion: 08/17/20 12:01 Dose:  0.03 units/kg/hr, 3 mls/hr


   Documented by: 


Potassium Chloride/Dextrose/Sod Cl (D5 Ns With 20 Meq Kcl)  1,000 mls @ 125 

mls/hr IV ASDIRECTED [facility]


   Last Admin: 08/17/20 06:23 Dose:  125 mls/hr


   Documented by: 


Insulin Glargine (Lantus Solostar)  30 units SUBCUT DAILY [facility]


   Last Admin: 08/17/20 10:34 Dose:  30 units


   Documented by: 


Lorazepam (Ativan)  0.5 mg IVPUSH Q4H PRN


   PRN Reason: Nausea/Vomiting


Magnesium Hydroxide (Milk Of Magnesia)  30 ml PO Q12H PRN


   PRN Reason: Constipation


Melatonin (Melatonin)  9 mg PO BEDTIME PRN


   PRN Reason: Sleep


Ondansetron HCl (Zofran)  4 mg IV Q6H PRN


   PRN Reason: Nausea/Vomiting


Ondansetron HCl (Zofran Odt)  4 mg PO Q6H PRN


   PRN Reason: Nausea able to take PO


Oxycodone HCl (Oxycodone)  5 - 10 mg PO Q4H PRN


   PRN Reason: Pain


   Last Admin: 08/17/20 12:58 Dose:  10 mg


   Documented by: 


Senna/Docusate Sodium (Senna Plus)  1 tab PO BID PRN


   PRN Reason: Constipation





Discontinued Medications





Hydromorphone HCl (Dilaudid)  0.5 mg IVPUSH ONETIME ONE


   Stop: 08/16/20 17:49


   Last Admin: 08/16/20 17:55 Dose:  0.5 mg


   Documented by: 


Lactated Ringer's (Ringers, Lactated)  1,000 mls @ 1,000 mls/hr IV BOLUS ONE


   Stop: 08/16/20 17:55


   Last Admin: 08/16/20 17:31 Dose:  1,000 mls/hr


   Documented by: 


Sodium Chloride (Normal Saline)  1,000 mls @ 125 mls/hr IV ASDIRECTED [facility]


   Last Admin: 08/16/20 20:22 Dose:  125 mls/hr


   Documented by: 


Sodium Chloride (Normal Saline)  500 mls @ 500 mls/hr IV ASDIRECTED [facility]


   Stop: 08/16/20 19:31


   Last Admin: 08/16/20 19:11 Dose:  500 mls/hr


   Documented by: 


Metoclopramide HCl (Reglan)  10 mg IVPUSH ONETIME ONE


   Stop: 08/16/20 16:58


   Last Admin: 08/16/20 17:31 Dose:  10 mg


   Documented by: 


Pantoprazole Sodium (Protonix Iv***)  40 mg IVPUSH ONETIME ONE


   Stop: 08/16/20 20:01


   Last Admin: 08/16/20 19:11 Dose:  40 mg


   Documented by: 











- Exam


Quality Assessment: DVT Prophylaxis


General: Alert, Oriented, Cooperative, No Acute Distress


Lungs: Clear to Auscultation, Normal Respiratory Effort


Cardiovascular: Regular Rate, Regular Rhythm, No Murmurs


GI/Abdominal Exam: Soft, Non-Tender, No Organomegaly, No Distention


Extremities: Non-Tender, No Pedal Edema





Sepsis Event Note





- Evaluation


Sepsis Screening Result: No Definite Risk





- Focused Exam


Vital Signs: 


                                   Vital Signs











  Temp Pulse Resp BP Pulse Ox


 


 08/17/20 11:58  97.1 F  104 H  20  110/67  98


 


 08/17/20 11:00   109 H  18  109/59 L  96


 


 08/17/20 08:00  97.1 F   11 L  99/58 L  99


 


 08/17/20 06:00   99  14  99/62  98


 


 08/17/20 04:00   102 H  15  98/57 L  98


 


 08/17/20 02:00   106 H  11 L  101/60  98














- Problem List Review


Problem List Initiated/Reviewed/Updated: Yes





- My Orders


Last 24 Hours: 


My Active Orders





08/17/20 10:15


Insulin Glarg,Human.Rec.Analog [LantUS Solostar]   30 units SUBCUT DAILY 





08/17/20 Lunch


Consistent Carbohydrate Diet [DIET] 





08/17/20 12:59


BASIC METABOLIC PANEL,BMP [CHEM] Stat 


MAGNESIUM [CHEM] Stat 





08/18/20 05:00


BASIC METABOLIC PANEL,BMP [CHEM] Timed 


CBC WITH AUTO DIFF [HEME] Timed 


MAGNESIUM [CHEM] Timed 














- Plan


Plan:: 





ASSESSMENT AND PLAN - 





Diabetic ketoacidosis without coma-proved from admission but not totally 

resolved


-Low-dose insulin drip


-Normal saline infusion


-I anticipate she will need dextrose containing solution to keep her blood 

sugars in the normal range for her anion gap closes


-BMP every 4 hours until her anion gap is normal


-Restart long-acting insulin


-Diabetes education





High anion gap metabolic acidosis-improving with management of ketoacidosis


-Management as above





Recent abdominal abscesses-not currently on antibiotics.  She is receiving daily

 wound care.





Maintenance issues - 


- DVT prophylaxis -SCDs


- GI prophylaxis -one-time dose of PPI tonight


- Nutrition -clear liquids


- Franco catheter -not indicated





CODE STATUS -full code





Admission justification -this patient will be admitted for inpatient services 

and is medically appropriate meeting medical necessity for inpatient admission 

as outlined in my documentation.  I reasonably expect the patient will require 

inpatient services that span a period time over 2 midnights. I reasonably expect

 this patient to be discharged or transferred within 96 hours [facility]





Disposition -I would anticipate discharge home after the hospital stay





Primary care physician -[facility] in [place]

## 2020-10-26 NOTE — PCM.DCSUM1
**Discharge Summary





- Hospital Course


Brief History: Ms. Guo is a 30-year-old woman with known type 1 diabetes 

mellitus.  She was admitted through the emergency department with weakness, 

nausea, and abdominal pain, secondary to diabetic ketoacidosis.





- Discharge Data


Discharge Date: 08/18/20


Discharge Disposition: Home, Self-Care 01


Condition: Good





- Referral to Home Health


Primary Care Physician: 


PCP None








- Discharge Diagnosis/Problem(s)


(1) Diabetic ketoacidosis


SNOMED Code(s): 952314457, 191643657


   ICD Code: E13.10 - OTH DIABETES MELLITUS WITH KETOACIDOSIS WITHOUT COMA   

Status: Acute   Current Visit: Yes   


Qualifiers: 


   Diabetes mellitus type: type 1   Diabetes mellitus complication detail: 

without coma   Qualified Code(s): E10.10 - Type 1 diabetes mellitus with ket

oacidosis without coma   





(2) Methamphetamine abuse


SNOMED Code(s): 659325581


   ICD Code: F15.10 - OTHER STIMULANT ABUSE, UNCOMPLICATED   Status: Chronic   

Current Visit: No   





(3) Type 1 diabetes mellitus


SNOMED Code(s): 90826584


   ICD Code: E10.9 - TYPE 1 DIABETES MELLITUS WITHOUT COMPLICATIONS   Status: 

Chronic   Current Visit: No   





- Patient Summary/Data


Hospital Course: 





Ms. Guo presented to the emergency room with abdominal pain, nausea and 

vomiting.  Symptoms started yesterday and have progressed over that time.  She 

describes her abdominal pain as achy generalized pain.  Pain has been relatively

constant but does get worse when she vomits.  She did not take anything at home 

to make the pain better.  Pain is slowly been getting worse.  She has had 

multiple episodes of nausea with vomiting and has been unable to keep down any 

food and only small quantities of liquid.  She reports that she ran out of her 

insulin a couple of days ago.  She thought she had enough to get through until 

her appointment 2 days from now but came up several days short.  Yesterday she 

had polyuria but they have admission her urine output seems to be less than 

usual.  No complaints of shortness of breath or fever but she does report a mild

cough. She does have several open abdominal wounds from abscess debridement and 

these are healing.  She is getting daily wound care from home care.  She is not 

currently on antibiotics. Work-up in the emergency room revealed evidence for 

diabetic ketoacidosis with a pH of 7.1 on venous blood gases.  Blood sugar is 

nearly 400.  Anion gap is 30 and her bicarb is only 8.  She has received IV 

fluids and a dose of metoclopramide.  She was going to be admitted for 

management of diabetic ketoacidosis.  She received IV insulin bolus in the 

emergency department and was started on a continuous infusion of IV insulin per 

ketoacidosis protocol.  She also received vigorous IV fluid replacement per 

protocol and management of electrolytes with electrolyte replacement as needed. 

Over the first 24 hours of hospitalization ketoacidosis resolved, she was 

transitioned back to her usual dose of long-acting insulin with sliding scale 

Humalog.  She remained stable until the morning of discharge.  Activity will be 

as tolerated and she will remain on a consistent carbohydrate diet.  She has a f

ollow-up appointment with her primary care provider later this afternoon and 

endocrinology tomorrow.





- Patient Instructions


Diet: Diabetic Diet


Activity: As Tolerated


Other/Special Instructions: Patient already has scheduled appointment with 

primary care provider later today and endocrinology tomorrow.





- Discharge Plan


*PRESCRIPTION DRUG MONITORING PROGRAM REVIEWED*: No


*COPY OF PRESCRIPTION DRUG MONITORING REPORT IN PATIENT SHELIA: No


Home Medications: 


                                    Home Meds





Insulin Aspart [Novolog Flexpen] 5 - 15 unit SQ TIDAC 01/03/15 [History]


Insulin Detemir [Levemir] 30 unit SQ ACBREAKFAST 01/03/15 [History]


Insulin Detemir [Levemir] 40 units SQ ACDINNER 08/16/20 [History]








Referrals: 


Parrish Magana DO [Ordering Only Provider] - 





- Discharge Summary/Plan Comment


DC Time >30 min.: No





- Patient Data


Vitals - Most Recent: 


                                Last Vital Signs











Temp  98 F   08/18/20 04:00


 


Pulse  96   08/17/20 17:38


 


Resp  12   08/18/20 05:52


 


BP  96/57 L  08/18/20 05:52


 


Pulse Ox  99   08/18/20 05:52











Weight - Most Recent: 178 lb 7.991 oz


I&O - Last 24 hours: 


                                 Intake & Output











 08/17/20 08/18/20 08/18/20





 22:59 06:59 14:59


 


Intake Total 720  


 


Output Total 400  


 


Balance 320  











Lab Results - Last 24 hrs: 


                         Laboratory Results - last 24 hr











  08/17/20 08/17/20 08/18/20 Range/Units





  12:59 16:53 05:00 


 


WBC    9.5  (4.5-11.0)  K/uL


 


RBC    4.29  (3.30-5.50)  M/uL


 


Hgb    11.1 L  (12.0-15.0)  g/dL


 


Hct    35.7 L  (36.0-48.0)  %


 


MCV    83  (80-98)  fL


 


MCH    26 L  (27-31)  pg


 


MCHC    31 L  (32-36)  %


 


Plt Count    311  (150-400)  K/uL


 


Neut % (Auto)    54  (36-66)  %


 


Lymph % (Auto)    35  (24-44)  %


 


Mono % (Auto)    11 H  (2-6)  %


 


Eos % (Auto)    1 L  (2-4)  %


 


Baso % (Auto)    0  (0-1)  %


 


Sodium  136 L  140   (140-148)  mmol/L


 


Potassium  3.9  4.0   (3.6-5.2)  mmol/L


 


Chloride  108  110 H   (100-108)  mmol/L


 


Carbon Dioxide  19 L  23   (21-32)  mmol/L


 


Anion Gap  12.9  11.0   (5.0-14.0)  mmol/L


 


BUN  7  7   (7-18)  mg/dL


 


Creatinine  0.9  0.8   (0.6-1.0)  mg/dL


 


Est Cr Clr Drug Dosing  97.94  110.18   mL/min


 


Estimated GFR (MDRD)  > 60  > 60   (>60)  


 


Glucose  345 H  248 H   ()  mg/dL


 


Calcium  7.7 L  7.7 L   (8.5-10.1)  mg/dL


 


Magnesium  1.6 L  1.7 L   (1.8-2.4)  mg/dL














  08/18/20 Range/Units





  05:00 


 


WBC   (4.5-11.0)  K/uL


 


RBC   (3.30-5.50)  M/uL


 


Hgb   (12.0-15.0)  g/dL


 


Hct   (36.0-48.0)  %


 


MCV   (80-98)  fL


 


MCH   (27-31)  pg


 


MCHC   (32-36)  %


 


Plt Count   (150-400)  K/uL


 


Neut % (Auto)   (36-66)  %


 


Lymph % (Auto)   (24-44)  %


 


Mono % (Auto)   (2-6)  %


 


Eos % (Auto)   (2-4)  %


 


Baso % (Auto)   (0-1)  %


 


Sodium  141  (140-148)  mmol/L


 


Potassium  3.4 L  (3.6-5.2)  mmol/L


 


Chloride  109 H  (100-108)  mmol/L


 


Carbon Dioxide  25  (21-32)  mmol/L


 


Anion Gap  10.4  (5.0-14.0)  mmol/L


 


BUN  4 L  (7-18)  mg/dL


 


Creatinine  0.4 L  (0.6-1.0)  mg/dL


 


Est Cr Clr Drug Dosing  220.37  mL/min


 


Estimated GFR (MDRD)  > 60  (>60)  


 


Glucose  72 L  ()  mg/dL


 


Calcium  7.9 L  (8.5-10.1)  mg/dL


 


Magnesium  1.7 L  (1.8-2.4)  mg/dL











Med Orders - Current: 


                               Current Medications





Acetaminophen (Tylenol)  650 mg PO Q4H PRN


   PRN Reason: Pain (Mild 1-3)/fever


Dextrose (Glutose 15)  15 gm PO ONETIME PRN


   PRN Reason: Hypoglycemia


Dextrose/Water (Dextrose 50% In Water)  50 ml IV ONETIME PRN


   PRN Reason: Hypoglycemia


Hydromorphone HCl (Dilaudid)  0.5 mg IVPUSH Q2H PRN


   PRN Reason: Pain (severe 7-10)


   Last Admin: 08/17/20 01:54 Dose:  0.5 mg


   Documented by: 


Insulin Glargine (Lantus Solostar)  30 units SUBCUT DAILY [facility]


   Last Admin: 08/18/20 08:42 Dose:  30 units


   Documented by: 


Insulin Glargine (Lantus Solostar)  40 units SUBCUT BEDTIME [facility]


Insulin Human Lispro (Humalog)  0 unit SUBCUT QIDACANDBED [facility]; Protocol


   Last Admin: 08/18/20 08:40 Dose:  2 units


   Documented by: 


Lorazepam (Ativan)  0.5 mg IVPUSH Q4H PRN


   PRN Reason: Nausea/Vomiting


Magnesium Hydroxide (Milk Of Magnesia)  30 ml PO Q12H PRN


   PRN Reason: Constipation


Magnesium Oxide (Magnesium Oxide)  400 mg PO BID [facility]


Melatonin (Melatonin)  9 mg PO BEDTIME PRN


   PRN Reason: Sleep


Ondansetron HCl (Zofran)  4 mg IV Q6H PRN


   PRN Reason: Nausea/Vomiting


Ondansetron HCl (Zofran Odt)  4 mg PO Q6H PRN


   PRN Reason: Nausea able to take PO


Oxycodone HCl (Oxycodone)  5 - 10 mg PO Q4H PRN


   PRN Reason: Pain


   Last Admin: 08/18/20 07:22 Dose:  10 mg


   Documented by: 


Senna/Docusate Sodium (Senna Plus)  1 tab PO BID PRN


   PRN Reason: Constipation





Discontinued Medications





Hydromorphone HCl (Dilaudid)  0.5 mg IVPUSH ONETIME ONE


   Stop: 08/16/20 17:49


   Last Admin: 08/16/20 17:55 Dose:  0.5 mg


   Documented by: 


Lactated Ringer's (Ringers, Lactated)  1,000 mls @ 1,000 mls/hr IV BOLUS ONE


   Stop: 08/16/20 17:55


   Last Admin: 08/16/20 17:31 Dose:  1,000 mls/hr


   Documented by: 


Insulin Regular in 0.9 % NACL (Myxredlin 100 Unit/100 Ml)  100 mls @ 9.072 

mls/hr IV ASDIRECTED [facility]; Protocol


   Last Infusion: 08/17/20 15:22 Dose:  0.03 units/kg/hr, 2.5 mls/hr


   Documented by: 


Sodium Chloride (Normal Saline)  1,000 mls @ 125 mls/hr IV ASDIRECTED [facility]


   Last Admin: 08/16/20 20:22 Dose:  125 mls/hr


   Documented by: 


Sodium Chloride (Normal Saline)  500 mls @ 500 mls/hr IV ASDIRECTED [facility]


   Stop: 08/16/20 19:31


   Last Admin: 08/16/20 19:11 Dose:  500 mls/hr


   Documented by: 


Potassium Chloride/Dextrose/Sod Cl (D5 Ns With 20 Meq Kcl)  1,000 mls @ 125 

mls/hr IV ASDIRECTED [facility]


   Last Admin: 08/17/20 14:19 Dose:  125 mls/hr


   Documented by: 


Insulin Glargine (Lantus Solostar)  40 units SUBCUT ACDINNER [facility]


Insulin Glargine (Lantus Solostar)  40 units SUBCUT BEDTIME [facility]


   Last Admin: 08/17/20 20:35 Dose:  40 unit


   Documented by: 


Metoclopramide HCl (Reglan)  10 mg IVPUSH ONETIME ONE


   Stop: 08/16/20 16:58


   Last Admin: 08/16/20 17:31 Dose:  10 mg


   Documented by: 


Pantoprazole Sodium (Protonix Iv***)  40 mg IVPUSH ONETIME ONE


   Stop: 08/16/20 20:01


   Last Admin: 08/16/20 19:11 Dose:  40 mg


   Documented by: 


Potassium Chloride (Klor-Con M20)  40 meq PO ONETIME ONE


   Stop: 08/18/20 09:16











- Exam


General: Reports: Alert, Oriented, Cooperative, No Acute Distress


Lungs: Reports: Clear to Auscultation, Normal Respiratory Effort


Cardiovascular: Reports: Regular Rate, Regular Rhythm, No Murmurs


GI/Abdominal Exam: Soft, Non-Tender, No Organomegaly, No Distention
negative

## 2020-12-12 NOTE — EDM.PDOC
ED HPI GENERAL MEDICAL PROBLEM





- General


Chief Complaint: Diabetic Complaint


Stated Complaint: BLOOD SUGAR LEVELS


Time Seen by Provider: 12/12/20 08:11


Source of Information: Reports: Patient, EMS


History Limitations: Reports: Physical Impairment





- History of Present Illness


INITIAL COMMENTS - FREE TEXT/NARRATIVE: 





Patient presents by ambulance from home because of several days being out of 

insulin and feeling nauseated, fatigued, generally ill.  She ran out of insulin 

and went to the [facility] clinic at [place] earlier in the week.

 She apparently did not have an active order for insulin at that time in their 

facility and she was unable to get any.  She gradually felt worse and worse over

the ensuing days resulting in calling 911 today and presenting by ambulance.  

She has had other emergency department visits and admissions for uncontrolled 

diabetes in the past.  She denies any other recent illness.  Her worst feeling 

at the moment is generalized body aches and nausea.


Onset: Gradual


Duration: Day(s): (3)


Location: Reports: Generalized


Severity: Moderate


Improves with: Reports: None


Worsens with: Reports: None


  ** Generalized


Pain Score (Numeric/FACES): 10





- Related Data


                                    Allergies











Allergy/AdvReac Type Severity Reaction Status Date / Time


 


ibuprofen Allergy Unknown Swelling Verified 12/12/20 08:15











Home Meds: 


                                    Home Meds





Insulin Aspart [Novolog Flexpen] 5 - 15 unit SQ TIDAC 01/03/15 [History]


Insulin Detemir [Levemir] 50 unit SQ ACBREAKFAST 01/03/15 [History]


Insulin Detemir [Levemir] 50 units SQ ACDINNER 08/16/20 [History]











Past Medical History


Gastrointestinal History: Reports: Cholelithiasis, Other (See Below)


Other Gastrointestinal History: 90# weight loss in past year


Musculoskeletal History: Reports: Other (See Below)


Other Musculoskeletal History: chronic left knee pain


Psychiatric History: Reports: Addiction


Endocrine/Metabolic History: Reports: Diabetes, Type I, IDDM


Hematologic History: Reports: Anemia


Dermatologic History: Reports: Other (See Below)


Other Dermatologic History: boils on buttock and abdomin





- Infectious Disease History


Infectious Disease History: Reports: Chicken Pox, MRSA


Other Infectious Disease History: MRSA HX OF IN ABD. WOUND





- Past Surgical History


GI Surgical History: Reports: Cholecystectomy


Endocrine Surgical History: Reports: None


Musculoskeletal Surgical History: Reports: None


Dermatological Surgical History: Reports: None





Social & Family History





- Family History


Family Medical History: No Pertinent Family History





- Caffeine Use


Caffeine Use: Reports: None





- Living Situation & Occupation


Living situation: Reports: Single





ED ROS GENERAL





- Review of Systems


Review Of Systems: See Below


Constitutional: Reports: Malaise, Weakness.  Denies: Fever, Chills


HEENT: Reports: No Symptoms


Respiratory: Reports: No Symptoms


Cardiovascular: Reports: No Symptoms


Endocrine: Reports: Fatigue, High Glucose


GI/Abdominal: Reports: Nausea, Vomiting


: Reports: No Symptoms


Musculoskeletal: Reports: Muscle Pain


Skin: Reports: No Symptoms





ED EXAM GENERAL NO PERIP PULSE





- Physical Exam


Exam: See Below


Text/Narrative:: 





This is a fatigued looking and sounding adult female evaluated in room 4.  She 

has very poor vascular status and it is difficult to obtain IV access and lab 

specimens.


Exam Limited By: No Limitations


General Appearance: Lethargic, Mild Distress


Respiratory/Chest: Normal Breath Sounds


Cardiovascular: Tachycardia


GI/Abdominal: Tender (Diffuse).  No: Guarding


Neurological: Slow to Respond


Psychiatric: Flat Affect





Course





- Vital Signs


Last Recorded V/S: 


                                Last Vital Signs











Temp  35.8 C L  12/12/20 08:00


 


Pulse  124 H  12/12/20 10:32


 


Resp  16   12/12/20 10:32


 


BP  116/72   12/12/20 10:32


 


Pulse Ox  100   12/12/20 10:32














- Orders/Labs/Meds


Orders: 


                               Active Orders 24 hr











 Category Date Time Status


 


 DRUG SCREEN, URINE [URCHEM] Stat Lab  12/12/20 08:19 Ordered


 


 URINALYSIS W/MICROSCOPIC [UA W/MICROSCOPIC] [URIN] Stat Lab  12/12/20 08:19 

Ordered


 


 Dextrose 50% in Water Med  12/12/20 08:18 Active





 50 ml IVPUSH ASDIRECTED PRN   


 


 Glucagon,Human Recombinant [GlucaGen] Med  12/12/20 08:18 Active





 1 mg IM ASDIRECTED PRN   


 


 Sodium Chloride 0.9% [Normal Saline] 1,000 ml Med  12/12/20 10:53 Ordered





 IV .BOLUS   


 


 Sodium Chloride 0.9% [Saline Flush] Med  12/12/20 08:17 Active





 10 ml FLUSH ASDIRECTED PRN   


 


 Saline Lock Insert [OM.PC] Routine Oth  12/12/20 08:17 Ordered








                                Medication Orders





Dextrose/Water (Dextrose 50% In Water)  50 ml IVPUSH ASDIRECTED PRN


   PRN Reason: Hypoglycemia


Glucagon (Glucagen)  1 mg IM ASDIRECTED PRN


   PRN Reason: Hypoglycemia


Sodium Chloride (Saline Flush)  10 ml FLUSH ASDIRECTED PRN


   PRN Reason: Keep Vein Open


   Last Admin: 12/12/20 08:33  Dose: 10 ml


   Documented by: FEDE








Labs: 


                                Laboratory Tests











  12/12/20 12/12/20 12/12/20 Range/Units





  08:45 08:45 08:45 


 


WBC  17.2 H    (4.5-11.0)  K/uL


 


RBC  5.96 H    (3.30-5.50)  M/uL


 


Hgb  16.1 H D    (12.0-15.0)  g/dL


 


Hct  51.5 H    (36.0-48.0)  %


 


MCV  86    (80-98)  fL


 


MCH  27    (27-31)  pg


 


MCHC  31 L    (32-36)  %


 


Plt Count  290    (150-400)  K/uL


 


Neut % (Auto)  88 H    (36-66)  %


 


Lymph % (Auto)  9 L    (24-44)  %


 


Mono % (Auto)  3    (2-6)  %


 


Eos % (Auto)  0 L    (2-4)  %


 


Baso % (Auto)  0    (0-1)  %


 


ABG Hemoglobin   16.3 H   (12.0-16.0)  g/dL


 


ABG Oxyhemoglobin   93.6   %


 


ABG Carboxyhemoglobin   2.2 H   (0.0-1.6)  %


 


ABG Methemoglobin   1.7   %


 


VBG pH   7.125 L   (7.350-7.450)  


 


VBG pCO2   18.1   mm/Hg


 


VBG pO2   134   mm/Hg


 


VBG HCO3   5.7   mmol/L


 


VBG Total CO2   5.3   mmol/L


 


VBG O2 Saturation   97.4   


 


VBG O2 Content   21.7   %vol


 


VBG Base Excess   -23.2   mm/L


 


O2 Delivery Device   Room air   


 


Sodium    134 L  (140-148)  mmol/L


 


Potassium    4.5  (3.6-5.2)  mmol/L


 


Chloride    96 L  (100-108)  mmol/L


 


Carbon Dioxide    6 L D  (21-32)  mmol/L


 


Anion Gap    36.5 H  (5.0-14.0)  mmol/L


 


BUN    24 H D  (7-18)  mg/dL


 


Creatinine    1.2 H D  (0.6-1.0)  mg/dL


 


Est Cr Clr Drug Dosing    70.99  mL/min


 


Estimated GFR (MDRD)    52 L  (>60)  


 


Glucose    461 H*  ()  mg/dL


 


Lactic Acid     (0.4-2.0)  mmol/L


 


Calcium    8.7  (8.5-10.1)  mg/dL


 


Total Bilirubin    0.5  (0.2-1.0)  mg/dL


 


AST    20  (15-37)  U/L


 


ALT    25  D  (12-78)  U/L


 


Alkaline Phosphatase    146 H  ()  U/L


 


C-Reactive Protein    < 0.05  (0.0-0.3)  mg/dL


 


Total Protein    8.4 H  (6.4-8.2)  g/dL


 


Albumin    4.0  (3.4-5.0)  g/dL


 


Globulin    4.4 H  (2.3-3.5)  g/dL


 


Albumin/Globulin Ratio    0.9 L  (1.2-2.2)  


 


Acetaminophen     (10.0-30.0)  ug/mL


 


Ethyl Alcohol     mg/dL


 


SARS CoV-2 RNA Rapid MORALES     














  12/12/20 12/12/20 12/12/20 Range/Units





  08:45 08:45 08:45 


 


WBC     (4.5-11.0)  K/uL


 


RBC     (3.30-5.50)  M/uL


 


Hgb     (12.0-15.0)  g/dL


 


Hct     (36.0-48.0)  %


 


MCV     (80-98)  fL


 


MCH     (27-31)  pg


 


MCHC     (32-36)  %


 


Plt Count     (150-400)  K/uL


 


Neut % (Auto)     (36-66)  %


 


Lymph % (Auto)     (24-44)  %


 


Mono % (Auto)     (2-6)  %


 


Eos % (Auto)     (2-4)  %


 


Baso % (Auto)     (0-1)  %


 


ABG Hemoglobin     (12.0-16.0)  g/dL


 


ABG Oxyhemoglobin     %


 


ABG Carboxyhemoglobin     (0.0-1.6)  %


 


ABG Methemoglobin     %


 


VBG pH     (7.350-7.450)  


 


VBG pCO2     mm/Hg


 


VBG pO2     mm/Hg


 


VBG HCO3     mmol/L


 


VBG Total CO2     mmol/L


 


VBG O2 Saturation     


 


VBG O2 Content     %vol


 


VBG Base Excess     mm/L


 


O2 Delivery Device     


 


Sodium     (140-148)  mmol/L


 


Potassium     (3.6-5.2)  mmol/L


 


Chloride     (100-108)  mmol/L


 


Carbon Dioxide     (21-32)  mmol/L


 


Anion Gap     (5.0-14.0)  mmol/L


 


BUN     (7-18)  mg/dL


 


Creatinine     (0.6-1.0)  mg/dL


 


Est Cr Clr Drug Dosing     mL/min


 


Estimated GFR (MDRD)     (>60)  


 


Glucose     ()  mg/dL


 


Lactic Acid    2.2 H  (0.4-2.0)  mmol/L


 


Calcium     (8.5-10.1)  mg/dL


 


Total Bilirubin     (0.2-1.0)  mg/dL


 


AST     (15-37)  U/L


 


ALT     (12-78)  U/L


 


Alkaline Phosphatase     ()  U/L


 


C-Reactive Protein     (0.0-0.3)  mg/dL


 


Total Protein     (6.4-8.2)  g/dL


 


Albumin     (3.4-5.0)  g/dL


 


Globulin     (2.3-3.5)  g/dL


 


Albumin/Globulin Ratio     (1.2-2.2)  


 


Acetaminophen  0.0 L    (10.0-30.0)  ug/mL


 


Ethyl Alcohol   < 3   mg/dL


 


SARS CoV-2 RNA Rapid MROALES     














  12/12/20 12/12/20 Range/Units





  10:06 10:17 


 


WBC    (4.5-11.0)  K/uL


 


RBC    (3.30-5.50)  M/uL


 


Hgb    (12.0-15.0)  g/dL


 


Hct    (36.0-48.0)  %


 


MCV    (80-98)  fL


 


MCH    (27-31)  pg


 


MCHC    (32-36)  %


 


Plt Count    (150-400)  K/uL


 


Neut % (Auto)    (36-66)  %


 


Lymph % (Auto)    (24-44)  %


 


Mono % (Auto)    (2-6)  %


 


Eos % (Auto)    (2-4)  %


 


Baso % (Auto)    (0-1)  %


 


ABG Hemoglobin    (12.0-16.0)  g/dL


 


ABG Oxyhemoglobin    %


 


ABG Carboxyhemoglobin    (0.0-1.6)  %


 


ABG Methemoglobin    %


 


VBG pH    (7.350-7.450)  


 


VBG pCO2    mm/Hg


 


VBG pO2    mm/Hg


 


VBG HCO3    mmol/L


 


VBG Total CO2    mmol/L


 


VBG O2 Saturation    


 


VBG O2 Content    %vol


 


VBG Base Excess    mm/L


 


O2 Delivery Device    


 


Sodium  139 L   (140-148)  mmol/L


 


Potassium  3.9   (3.6-5.2)  mmol/L


 


Chloride  100   (100-108)  mmol/L


 


Carbon Dioxide  9 L   (21-32)  mmol/L


 


Anion Gap  33.9 H   (5.0-14.0)  mmol/L


 


BUN  24 H   (7-18)  mg/dL


 


Creatinine  1.1 H   (0.6-1.0)  mg/dL


 


Est Cr Clr Drug Dosing  77.44   mL/min


 


Estimated GFR (MDRD)  58 L   (>60)  


 


Glucose  359 H   ()  mg/dL


 


Lactic Acid    (0.4-2.0)  mmol/L


 


Calcium  8.2 L   (8.5-10.1)  mg/dL


 


Total Bilirubin    (0.2-1.0)  mg/dL


 


AST    (15-37)  U/L


 


ALT    (12-78)  U/L


 


Alkaline Phosphatase    ()  U/L


 


C-Reactive Protein    (0.0-0.3)  mg/dL


 


Total Protein    (6.4-8.2)  g/dL


 


Albumin    (3.4-5.0)  g/dL


 


Globulin    (2.3-3.5)  g/dL


 


Albumin/Globulin Ratio    (1.2-2.2)  


 


Acetaminophen    (10.0-30.0)  ug/mL


 


Ethyl Alcohol    mg/dL


 


SARS CoV-2 RNA Rapid MORALES   Negative  











Meds: 


Medications











Generic Name Dose Route Start Last Admin





  Trade Name Freq  PRN Reason Stop Dose Admin


 


Dextrose/Water  50 ml  12/12/20 08:18 





  Dextrose 50% In Water  IVPUSH  





  ASDIRECTED PRN  





  Hypoglycemia  


 


Glucagon  1 mg  12/12/20 08:18 





  Glucagen  IM  





  ASDIRECTED PRN  





  Hypoglycemia  


 


Sodium Chloride  10 ml  12/12/20 08:17  12/12/20 08:33





  Saline Flush  FLUSH   10 ml





  ASDIRECTED PRN   Administration





  Keep Vein Open  














Discontinued Medications














Generic Name Dose Route Start Last Admin





  Trade Name Freq  PRN Reason Stop Dose Admin


 


Fentanyl  50 mcg  12/12/20 08:32  12/12/20 08:39





  Sublimaze  IVPUSH  12/12/20 08:33  50 mcg





  ONETIME ONE   Administration


 


Fentanyl  50 mcg  12/12/20 10:23  12/12/20 10:30





  Sublimaze  IVPUSH  12/12/20 10:24  50 mcg





  ONETIME ONE   Administration


 


Sodium Chloride  1,000 mls @ 999 mls/hr  12/12/20 08:17  12/12/20 08:35





  Normal Saline  IV  12/12/20 09:17  999 mls/hr





  .BOLUS ONE   Administration


 


Sodium Chloride  1,000 mls @ 999 mls/hr  12/12/20 09:41  12/12/20 09:46





  Normal Saline  IV  12/12/20 10:41  999 mls/hr





  .BOLUS ONE   Administration


 


Insulin Human Lispro  6 unit  12/12/20 08:18  12/12/20 08:40





  Humalog  IV  12/12/20 08:19  6 unit





  ONETIME ONE   Administration


 


Ondansetron HCl  4 mg  12/12/20 08:32  12/12/20 08:38





  Zofran  IVPUSH  12/12/20 08:33  4 mg





  ONETIME ONE   Administration


 


Ondansetron HCl  4 mg  12/12/20 10:23  12/12/20 10:30





  Zofran  IVPUSH  12/12/20 10:24  4 mg





  ONETIME ONE   Administration














- Re-Assessments/Exams


Free Text/Narrative Re-Assessment/Exam: 





12/12/20 08:26


Patient will be given normal saline by rapid infusion along with 6 units of 

Humalog IV.  Zofran 4 mg and fentanyl 50 mcg, both as IV doses will also be 

given.  She will need admission but the question would be whether it is here or 

whether she will need to be transferred to another facility?


12/12/20 08:38





12/12/20 10:07


This facility does not have ICU beds available and she will need an insulin drip

 for best titration of response to high glucose.  She hopes that she does not 

have to be transferred to [place] because of logistics reasons at discharge.  I 

contacted [facility] in [place] and spoke with Dr. LESLIE leon, they are coordinating hospitalist.  They have ICU beds available at this 

time, which is what she would need for an insulin drip.  He accepts her in 

transfer.  We will arrange ambulance transport and nurses will communicate 

between each facility.  A second liter of normal saline is under rapid infusion 

at this time and her COVID-19 screening swab as well as a repeat BMP are pending

 at this time.


12/12/20 10:24


She is feeling somewhat better but still has some nausea and ongoing generalized

 pain.  Zofran 4 mg and fentanyl 50 mcg, both IV doses, will be given again.  

She was told that she will be transferred to [place] ICU.  She did not 

feel like she could urinate yet but I told her that it was important to get that

 before we transfer her.


12/12/20 10:40


Repeat BMP shows a decrease in glucose of 100 points.  Potassium is 3.9.  Bicarb

 has increased slightly to 9, all of these showing some metabolic improvement.


12/12/20 10:54


1/3 L of normal saline will be started but run in at 250 mL/h.  No additional 

insulin will be given before transfer.





Departure





- Departure


Time of Disposition: 10:11


Disposition: DC/Tfer to [facility]


Clinical Impression: 


 High anion gap metabolic acidosis





Nausea and vomiting


Qualifiers:


 Vomiting type: unspecified Vomiting Intractability: non-intractable Qualified 

Code(s): R11.2 - Nausea with vomiting, unspecified





Diabetic ketoacidosis


Qualifiers:


 Diabetes mellitus type: type 1 Diabetes mellitus complication detail: without 

coma Qualified Code(s): E10.10 - Type 1 diabetes mellitus with ketoacidosis 

without coma








- Discharge Information


Referrals: 


PCP,None [Primary Care Provider] - 


Forms:  ED Department Discharge





Sepsis Event Note (ED)





- Focused Exam


Vital Signs: 


                                   Vital Signs











  Temp Pulse Resp BP Pulse Ox


 


 12/12/20 10:32   124 H  16  116/72  100


 


 12/12/20 09:32   131 H  16  135/91 H  100


 


 12/12/20 08:00  35.8 C L  134 H  18  132/88  99














- My Orders


Last 24 Hours: 


My Active Orders





12/12/20 08:17


Sodium Chloride 0.9% [Saline Flush]   10 ml FLUSH ASDIRECTED PRN 


Saline Lock Insert [OM.PC] Routine 





12/12/20 08:18


Dextrose 50% in Water   50 ml IVPUSH ASDIRECTED PRN 


Glucagon,Human Recombinant [GlucaGen]   1 mg IM ASDIRECTED PRN 





12/12/20 08:19


DRUG SCREEN, URINE [URCHEM] Stat 


URINALYSIS W/MICROSCOPIC [UA W/MICROSCOPIC] [URIN] Stat 





12/12/20 10:53


Sodium Chloride 0.9% [Normal Saline] 1,000 ml IV .BOLUS 














- Assessment/Plan


Last 24 Hours: 


My Active Orders





12/12/20 08:17


Sodium Chloride 0.9% [Saline Flush]   10 ml FLUSH ASDIRECTED PRN 


Saline Lock Insert [OM.PC] Routine 





12/12/20 08:18


Dextrose 50% in Water   50 ml IVPUSH ASDIRECTED PRN 


Glucagon,Human Recombinant [GlucaGen]   1 mg IM ASDIRECTED PRN 





12/12/20 08:19


DRUG SCREEN, URINE [URCHEM] Stat 


URINALYSIS W/MICROSCOPIC [UA W/MICROSCOPIC] [URIN] Stat 





12/12/20 10:53


Sodium Chloride 0.9% [Normal Saline] 1,000 ml IV .BOLUS